# Patient Record
Sex: FEMALE | Race: WHITE | NOT HISPANIC OR LATINO | Employment: OTHER
[De-identification: names, ages, dates, MRNs, and addresses within clinical notes are randomized per-mention and may not be internally consistent; named-entity substitution may affect disease eponyms.]

---

## 2017-07-01 ENCOUNTER — HEALTH MAINTENANCE LETTER (OUTPATIENT)
Age: 57
End: 2017-07-01

## 2017-08-23 ENCOUNTER — HOSPITAL ENCOUNTER (OUTPATIENT)
Dept: MAMMOGRAPHY | Facility: CLINIC | Age: 57
Discharge: HOME | End: 2017-08-23
Attending: INTERNAL MEDICINE | Admitting: INTERNAL MEDICINE
Payer: COMMERCIAL

## 2017-08-23 DIAGNOSIS — Z12.31 VISIT FOR SCREENING MAMMOGRAM: ICD-10-CM

## 2017-08-23 PROCEDURE — G0202 SCR MAMMO BI INCL CAD: HCPCS

## 2018-07-07 ENCOUNTER — HEALTH MAINTENANCE LETTER (OUTPATIENT)
Age: 58
End: 2018-07-07

## 2019-02-18 ENCOUNTER — THERAPY VISIT (OUTPATIENT)
Dept: PHYSICAL THERAPY | Facility: CLINIC | Age: 59
End: 2019-02-18
Payer: COMMERCIAL

## 2019-02-18 DIAGNOSIS — M54.50 LOW BACK PAIN: Primary | ICD-10-CM

## 2019-02-18 PROCEDURE — 97140 MANUAL THERAPY 1/> REGIONS: CPT | Mod: GP | Performed by: PHYSICAL THERAPIST

## 2019-02-18 PROCEDURE — 97162 PT EVAL MOD COMPLEX 30 MIN: CPT | Mod: GP | Performed by: PHYSICAL THERAPIST

## 2019-02-18 PROCEDURE — 97110 THERAPEUTIC EXERCISES: CPT | Mod: GP | Performed by: PHYSICAL THERAPIST

## 2019-02-18 NOTE — PROGRESS NOTES
[facility] Initial Evaluation  Subjective:  The history is provided by the patient and medical records.   Breanne Mckee is a 58 year old female with a lumbar condition.  Condition occurred with:  Insidious onset.  Condition occurred: for unknown reasons.  This is a new condition  February 13, 2019.  No injury.  After working the next day could barely walk afterwards due to the pain.  She has pain in left SI, hip area and radiating down into leg with a very heavy feeling in leg..    Patient reports pain:  SI joint left.  Radiates to:  Gluteals left and thigh left.  Pain is described as aching and is intermittent and reported as 7/10.  Associated symptoms:  Loss of strength and loss of motion/stiffness. Pain is the same all the time (sleeping less than 2 hours).  Symptoms are exacerbated by certain positions, standing, lying down and sitting (sit to stand and turning in bed) and relieved by nothing and ice.  Since onset symptoms are unchanged.  Special tests:  MRI (DJD, no disc dejon noted).  Previous treatment includes chiropractic.  There was mild improvement following previous treatment.  General health as reported by patient is good.  Pertinent medical history includes:  Thyroid problems.  Medical allergies: yes.  Other surgeries include:  Other.  Current medications:  Thyroid medication (Tylenol).  Current occupation is RN.  Patient is working in normal job without restrictions.  Primary job tasks include:  Prolonged standing and other (pushing/pulling, computer work).        Red flags:  None as reported by the patient.                        Objective:        Flexibility/Screens:       Lower Extremity:  Decreased left lower extremity flexibility:Piriformis                 Lumbar/SI Evaluation  ROM:    AROM Lumbar:   Flexion:          Hands to mid thigh, pain on left and increases in leg  Ext:                    BUCK ok   Side Bend:        Left:     Right:   Rotation:           Left:      Right:   Side Glide:        Left:  Major loss    Right:  Major loss          Lumbar Myotomes:  normal                Lumbar Dermtomes:  normal                  Lumbar Palpation:    Tenderness present at Left:    Piriformis; PSIS and Gluteus Medius          SI joint/Sacrum:        Left positive at:    Forward bend standing    Sacral conclusion left:  Anterior inominate                                               General     ROS    Assessment/Plan:    Patient is a 58 year old female with lumbar complaints.    Patient has the following significant findings with corresponding treatment plan.                Diagnosis 1:  Low back and left hip pain  Pain -  manual therapy  Decreased ROM/flexibility - manual therapy and therapeutic exercise    Therapy Evaluation Codes:   1) History comprised of:   Personal factors that impact the plan of care:      Past/current experiences and Profession.    Comorbidity factors that impact the plan of care are:      None.     Medications impacting care: None.  2) Examination of Body Systems comprised of:   Body structures and functions that impact the plan of care:      Lumbar spine and Sacral illiac joint.   Activity limitations that impact the plan of care are:      Bending, Dressing, Lifting, Sitting, Stairs, Standing, Walking, Working, Sleeping and Laying down.  3) Clinical presentation characteristics are:   Unstable/Unpredictable.  4) Decision-Making    Moderate complexity using standardized patient assessment instrument and/or measureable assessment of functional outcome.  Cumulative Therapy Evaluation is: Moderate complexity.    Previous and current functional limitations:  (See Goal Flow Sheet for this information)    Short term and Long term goals: (See Goal Flow Sheet for this information)     Communication ability:  Patient appears to be able to clearly communicate and understand verbal and written communication and follow directions correctly.  Treatment Explanation - The  following has been discussed with the patient:   RX ordered/plan of care  Anticipated outcomes  Possible risks and side effects  This patient would benefit from PT intervention to resume normal activities.   Rehab potential is excellent.    Frequency:  1 X week, once daily  Duration:  for 8 weeks  Discharge Plan:  Achieve all LTG.  Independent in home treatment program.  Reach maximal therapeutic benefit.    Please refer to the daily flowsheet for treatment today, total treatment time and time spent performing 1:1 timed codes.

## 2019-02-20 ENCOUNTER — APPOINTMENT (OUTPATIENT)
Dept: GENERAL RADIOLOGY | Facility: CLINIC | Age: 59
End: 2019-02-20
Attending: EMERGENCY MEDICINE
Payer: COMMERCIAL

## 2019-02-20 ENCOUNTER — HOSPITAL ENCOUNTER (OUTPATIENT)
Facility: CLINIC | Age: 59
Setting detail: OBSERVATION
Discharge: HOME | End: 2019-02-21
Attending: EMERGENCY MEDICINE | Admitting: INTERNAL MEDICINE
Payer: COMMERCIAL

## 2019-02-20 DIAGNOSIS — M54.59 INTRACTABLE LOW BACK PAIN: ICD-10-CM

## 2019-02-20 LAB
ANION GAP SERPL CALCULATED.3IONS-SCNC: 8 MMOL/L (ref 3–14)
BASOPHILS # BLD AUTO: 0 10E9/L (ref 0–0.2)
BASOPHILS NFR BLD AUTO: 0.2 %
BUN SERPL-MCNC: 20 MG/DL (ref 7–30)
CALCIUM SERPL-MCNC: 8.6 MG/DL (ref 8.5–10.1)
CHLORIDE SERPL-SCNC: 105 MMOL/L (ref 94–109)
CO2 SERPL-SCNC: 26 MMOL/L (ref 20–32)
CREAT SERPL-MCNC: 0.82 MG/DL (ref 0.52–1.04)
DIFFERENTIAL METHOD BLD: ABNORMAL
EOSINOPHIL # BLD AUTO: 0 10E9/L (ref 0–0.7)
EOSINOPHIL NFR BLD AUTO: 0.2 %
ERYTHROCYTE [DISTWIDTH] IN BLOOD BY AUTOMATED COUNT: 13.7 % (ref 10–15)
GFR SERPL CREATININE-BSD FRML MDRD: 78 ML/MIN/{1.73_M2}
GLUCOSE SERPL-MCNC: 139 MG/DL (ref 70–99)
HCT VFR BLD AUTO: 37.5 % (ref 35–47)
HGB BLD-MCNC: 12.1 G/DL (ref 11.7–15.7)
IMM GRANULOCYTES # BLD: 0.1 10E9/L (ref 0–0.4)
IMM GRANULOCYTES NFR BLD: 0.6 %
LYMPHOCYTES # BLD AUTO: 0.9 10E9/L (ref 0.8–5.3)
LYMPHOCYTES NFR BLD AUTO: 7.3 %
MCH RBC QN AUTO: 27.1 PG (ref 26.5–33)
MCHC RBC AUTO-ENTMCNC: 32.3 G/DL (ref 31.5–36.5)
MCV RBC AUTO: 84 FL (ref 78–100)
MONOCYTES # BLD AUTO: 0.1 10E9/L (ref 0–1.3)
MONOCYTES NFR BLD AUTO: 1.1 %
NEUTROPHILS # BLD AUTO: 11.5 10E9/L (ref 1.6–8.3)
NEUTROPHILS NFR BLD AUTO: 90.6 %
NRBC # BLD AUTO: 0 10*3/UL
NRBC BLD AUTO-RTO: 0 /100
PLATELET # BLD AUTO: 428 10E9/L (ref 150–450)
POTASSIUM SERPL-SCNC: 4.3 MMOL/L (ref 3.4–5.3)
RBC # BLD AUTO: 4.46 10E12/L (ref 3.8–5.2)
SODIUM SERPL-SCNC: 139 MMOL/L (ref 133–144)
WBC # BLD AUTO: 12.6 10E9/L (ref 4–11)

## 2019-02-20 PROCEDURE — G0378 HOSPITAL OBSERVATION PER HR: HCPCS

## 2019-02-20 PROCEDURE — 99220 ZZC INITIAL OBSERVATION CARE,LEVL III: CPT | Performed by: PHYSICIAN ASSISTANT

## 2019-02-20 PROCEDURE — 99285 EMERGENCY DEPT VISIT HI MDM: CPT | Mod: 25 | Performed by: EMERGENCY MEDICINE

## 2019-02-20 PROCEDURE — 73502 X-RAY EXAM HIP UNI 2-3 VIEWS: CPT

## 2019-02-20 PROCEDURE — 96376 TX/PRO/DX INJ SAME DRUG ADON: CPT

## 2019-02-20 PROCEDURE — 25000128 H RX IP 250 OP 636: Performed by: EMERGENCY MEDICINE

## 2019-02-20 PROCEDURE — 25000132 ZZH RX MED GY IP 250 OP 250 PS 637: Performed by: EMERGENCY MEDICINE

## 2019-02-20 PROCEDURE — 96375 TX/PRO/DX INJ NEW DRUG ADDON: CPT | Performed by: EMERGENCY MEDICINE

## 2019-02-20 PROCEDURE — 85025 COMPLETE CBC W/AUTO DIFF WBC: CPT | Performed by: EMERGENCY MEDICINE

## 2019-02-20 PROCEDURE — 25000132 ZZH RX MED GY IP 250 OP 250 PS 637: Performed by: PHYSICIAN ASSISTANT

## 2019-02-20 PROCEDURE — 80048 BASIC METABOLIC PNL TOTAL CA: CPT | Performed by: EMERGENCY MEDICINE

## 2019-02-20 PROCEDURE — 96376 TX/PRO/DX INJ SAME DRUG ADON: CPT | Performed by: EMERGENCY MEDICINE

## 2019-02-20 PROCEDURE — 99284 EMERGENCY DEPT VISIT MOD MDM: CPT | Mod: 25 | Performed by: EMERGENCY MEDICINE

## 2019-02-20 PROCEDURE — 96374 THER/PROPH/DIAG INJ IV PUSH: CPT | Performed by: EMERGENCY MEDICINE

## 2019-02-20 RX ORDER — HYDROMORPHONE HYDROCHLORIDE 1 MG/ML
.3-.5 INJECTION, SOLUTION INTRAMUSCULAR; INTRAVENOUS; SUBCUTANEOUS
Status: DISCONTINUED | OUTPATIENT
Start: 2019-02-20 | End: 2019-02-20 | Stop reason: DRUGHIGH

## 2019-02-20 RX ORDER — THYROID 60 MG/1
60 TABLET ORAL DAILY
Status: DISCONTINUED | OUTPATIENT
Start: 2019-02-21 | End: 2019-02-21 | Stop reason: HOSPADM

## 2019-02-20 RX ORDER — NALOXONE HYDROCHLORIDE 0.4 MG/ML
.1-.4 INJECTION, SOLUTION INTRAMUSCULAR; INTRAVENOUS; SUBCUTANEOUS
Status: DISCONTINUED | OUTPATIENT
Start: 2019-02-20 | End: 2019-02-20

## 2019-02-20 RX ORDER — ACETAMINOPHEN 500 MG
1000 TABLET ORAL ONCE
Status: COMPLETED | OUTPATIENT
Start: 2019-02-20 | End: 2019-02-20

## 2019-02-20 RX ORDER — LORAZEPAM 2 MG/ML
0.5 INJECTION INTRAMUSCULAR EVERY 4 HOURS PRN
Status: DISCONTINUED | OUTPATIENT
Start: 2019-02-20 | End: 2019-02-20

## 2019-02-20 RX ORDER — HYDROMORPHONE HYDROCHLORIDE 2 MG/1
2 TABLET ORAL
Status: DISCONTINUED | OUTPATIENT
Start: 2019-02-20 | End: 2019-02-21 | Stop reason: HOSPADM

## 2019-02-20 RX ORDER — LIDOCAINE 4 G/G
1 PATCH TOPICAL
Status: DISCONTINUED | OUTPATIENT
Start: 2019-02-20 | End: 2019-02-21 | Stop reason: HOSPADM

## 2019-02-20 RX ORDER — ACETAMINOPHEN 325 MG/1
975 TABLET ORAL
Status: DISCONTINUED | OUTPATIENT
Start: 2019-02-21 | End: 2019-02-21

## 2019-02-20 RX ORDER — LORAZEPAM 2 MG/ML
0.5 INJECTION INTRAMUSCULAR ONCE
Status: COMPLETED | OUTPATIENT
Start: 2019-02-20 | End: 2019-02-20

## 2019-02-20 RX ORDER — CALCIUM CARBONATE 500 MG/1
500 TABLET, CHEWABLE ORAL 2 TIMES DAILY PRN
Status: DISCONTINUED | OUTPATIENT
Start: 2019-02-20 | End: 2019-02-21 | Stop reason: HOSPADM

## 2019-02-20 RX ORDER — PROCHLORPERAZINE 25 MG
25 SUPPOSITORY, RECTAL RECTAL EVERY 12 HOURS PRN
Status: DISCONTINUED | OUTPATIENT
Start: 2019-02-20 | End: 2019-02-21 | Stop reason: HOSPADM

## 2019-02-20 RX ORDER — HYDROMORPHONE HCL/0.9% NACL/PF 0.2MG/0.2
0.2 SYRINGE (ML) INTRAVENOUS EVERY 4 HOURS PRN
Status: DISCONTINUED | OUTPATIENT
Start: 2019-02-20 | End: 2019-02-21 | Stop reason: HOSPADM

## 2019-02-20 RX ORDER — NALOXONE HYDROCHLORIDE 0.4 MG/ML
.1-.4 INJECTION, SOLUTION INTRAMUSCULAR; INTRAVENOUS; SUBCUTANEOUS
Status: DISCONTINUED | OUTPATIENT
Start: 2019-02-20 | End: 2019-02-21 | Stop reason: HOSPADM

## 2019-02-20 RX ORDER — CYCLOBENZAPRINE HCL 5 MG
5-10 TABLET ORAL 3 TIMES DAILY PRN
Status: DISCONTINUED | OUTPATIENT
Start: 2019-02-20 | End: 2019-02-21 | Stop reason: HOSPADM

## 2019-02-20 RX ORDER — LORAZEPAM 0.5 MG/1
0.5 TABLET ORAL EVERY 4 HOURS PRN
Status: DISCONTINUED | OUTPATIENT
Start: 2019-02-20 | End: 2019-02-20

## 2019-02-20 RX ORDER — HYDROMORPHONE HCL/0.9% NACL/PF 0.2MG/0.2
0.2 SYRINGE (ML) INTRAVENOUS ONCE
Status: COMPLETED | OUTPATIENT
Start: 2019-02-20 | End: 2019-02-20

## 2019-02-20 RX ORDER — ONDANSETRON 4 MG/1
4 TABLET, ORALLY DISINTEGRATING ORAL EVERY 6 HOURS PRN
Status: DISCONTINUED | OUTPATIENT
Start: 2019-02-20 | End: 2019-02-21 | Stop reason: HOSPADM

## 2019-02-20 RX ORDER — ONDANSETRON 2 MG/ML
4 INJECTION INTRAMUSCULAR; INTRAVENOUS EVERY 6 HOURS PRN
Status: DISCONTINUED | OUTPATIENT
Start: 2019-02-20 | End: 2019-02-21 | Stop reason: HOSPADM

## 2019-02-20 RX ORDER — ONDANSETRON 2 MG/ML
4 INJECTION INTRAMUSCULAR; INTRAVENOUS
Status: DISCONTINUED | OUTPATIENT
Start: 2019-02-20 | End: 2019-02-20 | Stop reason: DRUGHIGH

## 2019-02-20 RX ORDER — PROCHLORPERAZINE MALEATE 10 MG
10 TABLET ORAL EVERY 6 HOURS PRN
Status: DISCONTINUED | OUTPATIENT
Start: 2019-02-20 | End: 2019-02-21 | Stop reason: HOSPADM

## 2019-02-20 RX ORDER — TRAMADOL HYDROCHLORIDE 50 MG/1
50 TABLET ORAL ONCE
Status: DISCONTINUED | OUTPATIENT
Start: 2019-02-20 | End: 2019-02-20 | Stop reason: RX

## 2019-02-20 RX ADMIN — ACETAMINOPHEN 1000 MG: 500 TABLET, FILM COATED ORAL at 16:54

## 2019-02-20 RX ADMIN — Medication 0.5 MG: at 18:20

## 2019-02-20 RX ADMIN — HYDROMORPHONE HYDROCHLORIDE 2 MG: 2 TABLET ORAL at 23:41

## 2019-02-20 RX ADMIN — ONDANSETRON 4 MG: 2 INJECTION INTRAMUSCULAR; INTRAVENOUS at 21:50

## 2019-02-20 RX ADMIN — CALCIUM CARBONATE (ANTACID) CHEW TAB 500 MG 500 MG: 500 CHEW TAB at 22:37

## 2019-02-20 RX ADMIN — Medication 0.2 MG: at 14:41

## 2019-02-20 RX ADMIN — ONDANSETRON 4 MG: 2 INJECTION INTRAMUSCULAR; INTRAVENOUS at 14:38

## 2019-02-20 RX ADMIN — LIDOCAINE 1 PATCH: 560 PATCH PERCUTANEOUS; TOPICAL; TRANSDERMAL at 20:02

## 2019-02-20 RX ADMIN — LORAZEPAM 0.5 MG: 2 INJECTION, SOLUTION INTRAMUSCULAR; INTRAVENOUS at 15:33

## 2019-02-20 RX ADMIN — Medication 0.5 MG: at 20:30

## 2019-02-20 RX ADMIN — CYCLOBENZAPRINE HYDROCHLORIDE 5 MG: 5 TABLET, FILM COATED ORAL at 20:02

## 2019-02-20 ASSESSMENT — MIFFLIN-ST. JEOR
SCORE: 1153.25
SCORE: 1136.07

## 2019-02-20 NOTE — ED PROVIDER NOTES
History     Chief Complaint   Patient presents with     Back Pain     back pain for past week, seen at [facility], had MRI. today pain increased. no injury     HPI  Breanne Mckee is a 58 year old female who presents with low back pain, left buttock and left hip pain described as a searing tearing burning sensation that began on Wednesday without inciting trauma or strain.  She has not had such pain in the past.  Seen in urgent care on 2/16, diagnosed with low back pain, prescription for Toradol and prednisone.  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  She had MRI lumbar spine accomplished, was given a small dose of fentanyl and 3 Dilaudid tabs with Zofran.  Her MR was significant for facet disease, worst at L4-5 on the left side.  There is no significant spinal or foraminal stenosis.  She denies saddle anesthesia, loss control bowel, weakness or numbness.  Complains of some tingling intermittently in the left heel not currently.  No prior back surgery or significant injury.  Followed up at clinic this morning in [place], restarted on prednisone 60 mg daily, she took that and throughout.  Her pain is worse uncontrolled presents here for ongoing pain.  When queried why here versus [employer], she and her  report that it is snowing too hard to drive downtown today.    MRI spine 2/17/19  CONCLUSION:  1.  For counting purposes this patient is considered to have 5 lumbar type  vertebral bodies with a transitional partially sacralized L5 segment. Using this  counting method the tip of the spinal cord is located at the T12-L1 level. This  counting method is different from that used on the dictation from MRI  06/28/2011.  2.  Normal vertebral body heights, alignment and marrow signal.  3.  There is no high-grade central spinal canal narrowing in the lumbar region.  No severe right or left-sided foraminal narrowing. No significant lumbar  disc  herniation.  4.  At the L4-transitional L5 level there is severe facet arthropathy again seen  which was present previously. There is associated mild edema in the right L4 and  right L5 pedicles and bony right L4-L5 facets. This is likely all degenerative  in nature. Milder facet arthropathy at the remaining lumbar levels.    Allergies:  Allergies   Allergen Reactions     Codeine      Heart Palpitations     Erythromycin Hives     No Clinical Screening - See Comments      Allergic to all narcotics.       Penicillins Hives       Problem List:    Patient Active Problem List    Diagnosis Date Noted     Left-sided thoracic back pain 05/25/2016     Priority: Medium     Vaginitis and vulvovaginitis 11/12/2013     Priority: Medium     Problem list name updated by automated process. Provider to review       Shoulder pain 05/09/2013     Priority: Medium     Low back pain 01/31/2012     Priority: Medium     HYPERLIPIDEMIA LDL GOAL <160 10/31/2010     Priority: Medium     Hypothyroidism 02/10/2006     Priority: Medium     Hx of positive anitbodies with sx of severe depression in past.  Apparently had test showing poor metabolism from T4 to T3.  Started on armour thyroid.  All function test have been normal.  [facility] [place]  Problem list name updated by automated process. Provider to review          Past Medical History:    Past Medical History:   Diagnosis Date     NAREN 2 4/06     Depressive disorder, not elsewhere classified      Headache      Other and unspecified hyperlipidemia      Unspecified hypothyroidism        Past Surgical History:    Past Surgical History:   Procedure Laterality Date     CHOLECYSTECTOMY, LAPOROSCOPIC  3/22/10     LEEP TX, CERVICAL  4/06    NAREN 2       Family History:    Family History   Problem Relation Age of Onset     Diabetes Mother      Hypertension Mother      Cerebrovascular Disease Mother      Lipids Mother      Diabetes Father      Heart Disease Father       "Neurologic Disorder Father      Thyroid Disease Maternal Grandmother      Breast Cancer Maternal Grandmother      Respiratory Maternal Grandfather      Arthritis Paternal Grandmother      Heart Disease Paternal Grandfather        Social History:  Marital Status:   [2]  Social History     Tobacco Use     Smoking status: Never Smoker     Smokeless tobacco: Never Used   Substance Use Topics     Alcohol use: No     Drug use: No        Medications:      thyroid (ARMOUR THYROID) 60 MG tablet         Review of Systems  All other systems reviewed and are negative.    Physical Exam   BP: 106/56  Pulse: 59  Heart Rate: 57  Temp: 97.5  F (36.4  C)  Resp: 16  Height: 157.5 cm (5' 2\")  Weight: 60.8 kg (134 lb)  SpO2: 98 %      Physical Exam  Nontoxic-appearing no respiratory distress alert and oriented x3  In obvious discomfort, tearful at times  Head atraumatic no cephalic  Lungs clear  Heart regular no murmur  Abdomen soft nontender bowel sounds positive  Strength 5/5 throughout lower extremities, sensation intact light touch  Femoral pulses pedal pulses symmetrical and strong  No midline or paralumbar tenderness to palpation, there is some mild tenderness to the left SI joint, mild tenderness to palpation of the left upper outer buttock, and mild/moderate tenderness palpation left greater trochanter.  ED Course        Procedures               Critical Care time:  none                 Results for orders placed or performed during the hospital encounter of 02/20/19 (from the past 24 hour(s))   Pelvis XR w/ unilateral hip left    Narrative    PELVIS AND HIP LEFT ONE VIEW   2/20/2019 3:22 PM     HISTORY: Pain.    COMPARISON: 1/24/2011 pelvis x-ray.      Impression    IMPRESSION: Hip joint spaces appear well preserved. No evidence of  acute fracture or subluxation. No evidence of avascular necrosis.    VEGA GILES MD       Medications   ondansetron (ZOFRAN) injection 4 mg (4 mg Intravenous Given 2/20/19 4860) "   HYDROmorphone (DILAUDID) injection 0.2 mg (0.2 mg Intravenous Given 2/20/19 1441)   LORazepam (ATIVAN) injection 0.5 mg (0.5 mg Intravenous Given 2/20/19 1533)       Assessments & Plan (with Medical Decision Making)  58-year-old presents with a week's worth of low back pain, initially began as a pressure sensation across her lumbar, now is localized to her left upper outer buttock, left groin, described as a tearing sensation with any movement.  She responded well here to us hydromorphone 0.2 mg, and 0.5 mg of lorazepam.  Continues to have severe pain with any movement but is comfortable at rest.  Her exam is nonfocal, strength remains intact as does sensation, able to passively range her hip without significant worsening of pain, she has tenderness to palpation over the greater trochanter of the left side.  X-ray exam pelvis and left hip negative by my read as well as radiology.  Her MRI from 2/17/19 showed some mild multilevel foraminal stenosis, and severe facet arthropathy L4-5, although all on the right side.  Her pulses are intact and symmetrical, the leg is well perfused, there is no inguinal adenopathy.  Findings most consistent with lumbar radiculopathy, although as above MRI results are underwhelming.  No evidence for discitis, osteomyelitis, spinal stenosis, epidural fluid collection.  She is not tolerating her pain, and not able to treat adequately outpatient.  She will be admitted for ongoing pain control and further evaluation.  Reviewed with Zaida Jackson who accepts for hospitalist service.     I have reviewed the nursing notes.    I have reviewed the findings, diagnosis, plan and need for follow up with the patient.          Medication List      There are no discharge medications for this visit.         Final diagnoses:   Intractable low back pain       2/20/2019   [facility] EMERGENCY DEPARTMENT     Leonard Smith MD  02/20/19 9780

## 2019-02-20 NOTE — ED NOTES
"Patient has  [facility] to Observation  order. Patient has been given the Observation brochure -  What does Observation mean to me.\"  Patient has been given the opportunity to ask questions about observation status and their plan of care.      Laure Santos  "

## 2019-02-20 NOTE — ED NOTES
"Pt here with back pain, has had for 1 week. She was seen at an [facility] clinic this morning. Has taken 2 mg of oral dilaudid today and also got a shot of toradol at the clinic. Took 60 mg of prednisone today per the clinic but vomited them up. Was seen at [place] ER on Sunday and had an MRI. Pain has gotten much worse. Pain is radiating down her left leg, states \"it feels like my hip is tearing\".   "

## 2019-02-21 ENCOUNTER — APPOINTMENT (OUTPATIENT)
Dept: PHYSICAL THERAPY | Facility: CLINIC | Age: 59
End: 2019-02-21
Payer: COMMERCIAL

## 2019-02-21 VITALS
TEMPERATURE: 98.4 F | SYSTOLIC BLOOD PRESSURE: 127 MMHG | BODY MASS INDEX: 25.36 KG/M2 | OXYGEN SATURATION: 98 % | RESPIRATION RATE: 18 BRPM | HEART RATE: 71 BPM | HEIGHT: 62 IN | DIASTOLIC BLOOD PRESSURE: 61 MMHG | WEIGHT: 137.79 LBS

## 2019-02-21 PROCEDURE — 25000132 ZZH RX MED GY IP 250 OP 250 PS 637: Performed by: PHYSICIAN ASSISTANT

## 2019-02-21 PROCEDURE — 96376 TX/PRO/DX INJ SAME DRUG ADON: CPT

## 2019-02-21 PROCEDURE — 99207 ZZC CDG-CODE CATEGORY CHANGED: CPT | Performed by: FAMILY MEDICINE

## 2019-02-21 PROCEDURE — 25000128 H RX IP 250 OP 636: Performed by: PHYSICIAN ASSISTANT

## 2019-02-21 PROCEDURE — 97161 PT EVAL LOW COMPLEX 20 MIN: CPT | Mod: GP | Performed by: PHYSICAL THERAPIST

## 2019-02-21 PROCEDURE — G0378 HOSPITAL OBSERVATION PER HR: HCPCS

## 2019-02-21 PROCEDURE — 99217 ZZC OBSERVATION CARE DISCHARGE: CPT | Performed by: FAMILY MEDICINE

## 2019-02-21 PROCEDURE — 40000193 ZZH STATISTIC PT WARD VISIT: Performed by: PHYSICAL THERAPIST

## 2019-02-21 PROCEDURE — 25000132 ZZH RX MED GY IP 250 OP 250 PS 637: Performed by: FAMILY MEDICINE

## 2019-02-21 PROCEDURE — 25000132 ZZH RX MED GY IP 250 OP 250 PS 637: Performed by: INTERNAL MEDICINE

## 2019-02-21 RX ORDER — ACETAMINOPHEN 325 MG/1
975 TABLET ORAL 4 TIMES DAILY
Qty: 360 TABLET | Refills: 0 | COMMUNITY
Start: 2019-02-21

## 2019-02-21 RX ORDER — HYDROMORPHONE HYDROCHLORIDE 2 MG/1
2 TABLET ORAL
Qty: 18 TABLET | Refills: 0 | Status: SHIPPED | OUTPATIENT
Start: 2019-02-21 | End: 2019-02-24

## 2019-02-21 RX ORDER — CYCLOBENZAPRINE HCL 5 MG
5 TABLET ORAL ONCE
Status: COMPLETED | OUTPATIENT
Start: 2019-02-21 | End: 2019-02-21

## 2019-02-21 RX ORDER — ACETAMINOPHEN 325 MG/1
975 TABLET ORAL 4 TIMES DAILY
Status: DISCONTINUED | OUTPATIENT
Start: 2019-02-21 | End: 2019-02-21 | Stop reason: HOSPADM

## 2019-02-21 RX ORDER — ACETAMINOPHEN 325 MG/1
975 TABLET ORAL
Status: DISCONTINUED | OUTPATIENT
Start: 2019-02-21 | End: 2019-02-21

## 2019-02-21 RX ORDER — CYCLOBENZAPRINE HCL 5 MG
5-10 TABLET ORAL 3 TIMES DAILY PRN
Qty: 25 TABLET | Refills: 1 | Status: SHIPPED | OUTPATIENT
Start: 2019-02-21 | End: 2019-03-03

## 2019-02-21 RX ORDER — ONDANSETRON 4 MG/1
4 TABLET, ORALLY DISINTEGRATING ORAL EVERY 6 HOURS PRN
Qty: 20 TABLET | Refills: 1 | Status: SHIPPED | OUTPATIENT
Start: 2019-02-21

## 2019-02-21 RX ADMIN — ACETAMINOPHEN 975 MG: 325 TABLET, FILM COATED ORAL at 16:10

## 2019-02-21 RX ADMIN — CYCLOBENZAPRINE HYDROCHLORIDE 5 MG: 5 TABLET, FILM COATED ORAL at 09:17

## 2019-02-21 RX ADMIN — LEVOTHYROXINE, LIOTHYRONINE 60 MG: 38; 9 TABLET ORAL at 09:09

## 2019-02-21 RX ADMIN — OMEPRAZOLE 20 MG: 20 CAPSULE, DELAYED RELEASE ORAL at 06:19

## 2019-02-21 RX ADMIN — HYDROMORPHONE HYDROCHLORIDE 2 MG: 2 TABLET ORAL at 11:44

## 2019-02-21 RX ADMIN — ACETAMINOPHEN 975 MG: 325 TABLET, FILM COATED ORAL at 06:19

## 2019-02-21 RX ADMIN — ACETAMINOPHEN 975 MG: 325 TABLET, FILM COATED ORAL at 00:18

## 2019-02-21 RX ADMIN — CYCLOBENZAPRINE HYDROCHLORIDE 5 MG: 5 TABLET, FILM COATED ORAL at 06:19

## 2019-02-21 RX ADMIN — CYCLOBENZAPRINE HYDROCHLORIDE 10 MG: 5 TABLET, FILM COATED ORAL at 16:18

## 2019-02-21 RX ADMIN — ACETAMINOPHEN 975 MG: 325 TABLET, FILM COATED ORAL at 11:03

## 2019-02-21 RX ADMIN — Medication 0.2 MG: at 13:46

## 2019-02-21 NOTE — PROGRESS NOTES
02/21/19 0800   Quick Adds   Type of Visit Initial PT Evaluation   Living Environment   Lives With spouse   Living Arrangements house   Home Accessibility stairs to enter home;stairs within home   Functional Level Prior   Ambulation 0-->independent   Transferring 0-->independent   Toileting 0-->independent   Bathing 0-->independent   Communication 0-->understands/communicates without difficulty   Swallowing 0-->swallows foods/liquids without difficulty   Cognition 0 - no cognition issues reported   Fall history within last six months no   Prior Functional Level Comment Indep. at baseline   General Information   Onset of Illness/Injury or Date of Surgery - Date 02/20/19   Referring Physician Kaden CALABRESE   Patient/Family Goals Statement To decrease pain   Pertinent History of Current Problem (include personal factors and/or comorbidities that impact the POC) 58 year old female admitted on 2/20/2019. She has history of hashimotos thyroiditis. She presents with one week of lower back pain and one day of left hip pain. MRI revealed djd, severefacet arthroplasty  L4-L5   Precautions/Limitations spinal precautions   General Observations Pt  SL in bed- reports pain 3/10 at rest, but more intense with activity. (min/ mod)   Pain central LBP, Left glut area. Also c/o Left anterior  thigh pain.  no saddle numbness, change in B and B fucntion.  Pt reports  insidious onset of SX one week ago , recently became intolerable   Pain Assessment   Patient Currently in Pain Yes, see Vital Sign flowsheet   Range of Motion (ROM)   ROM Comment WFL. negative SLR, hip compression.   relief w/ long leg distraction  SI compression negative   Strength   Strength Comments Nt   Bed Mobility   Bed Mobility Comments SBA to indep. verbally reviewed body mechanics- avoiding bending/ twisting. monitoring of SX    Transfer Skills   Transfer Comments Indep   Gait   Gait Comments Amb 80 feet  x2 with no device, SBA to in dep. guarded ,but steady.   "Pain present, but manageable, able to navigate steps w/o difficulty    Clinical Impression   PT Diagnosis L-4-L5 facet arthropathy   Anticipated Discharge Disposition Home with Outpatient Therapy   Risk & Benefits of therapy have been explained Yes   Patient, Family & other staff in agreement with plan of care Yes   Clinical Impression Comments one time session, mobility now sufficient  for retun home.  : reports relief w/ long leg distraction, muscle energy techniques. recommned to continue w/ outpatient PT   [facility] AM-PAC  \"6 Clicks\" V.2 Basic Mobility Inpatient Short Form   1. Turning from your back to your side while in a flat bed without using bedrails? 3 - A Little   2. Moving from lying on your back to sitting on the side of a flat bed without using bedrails? 3 - A Little   3. Moving to and from a bed to a chair (including a wheelchair)? 3 - A Little   4. Standing up from a chair using your arms (e.g., wheelchair, or bedside chair)? 3 - A Little   5. To walk in hospital room? 3 - A Little   6. Climbing 3-5 steps with a railing? 3 - A Little   Basic Mobility Raw Score (Score out of 24.Lower scores equate to lower levels of function) 18   Total Evaluation Time   Total Evaluation Time (Minutes) 20     "

## 2019-02-21 NOTE — PROGRESS NOTES
"[facility] [place]    Patient admitted to room 2207 at approximately 2030 via cart from emergency room. Patient was accompanied by spouse.     Verbal SBAR report received from ED RN prior to patient arrival.     Patient ambulated to bed with stand-by assist. Patient alert and oriented X 3. Pain is not well controlled.  Medication(s) being used: prescription narcotics including severe 10/10 pain, Iv dilaudid administered which was effective.. 0-10 Pain Scale: 10. Admission vital signs: Blood pressure 131/70, pulse 68, temperature 97.8  F (36.6  C), temperature source Oral, resp. rate 16, height 1.575 m (5' 2\"), weight 62.5 kg (137 lb 12.6 oz), SpO2 97 %. Patient was oriented to plan of care, call light, bed controls, tv, telephone, bathroom and visiting hours.     Risk Assessment    The following safety risks were identified during admission: none. Yellow risk band applied: NO.     Skin Initial Assessment    This writer admitted this patient and completed a full skin assessment and Ilir score in the Adult PCS flowsheet. Appropriate interventions initiated as needed.     Secondary skin check completed by patient declined skin assessment, stating skin was fine and she was in too much pain. .    Ilir Risk Assessment  Sensory Perception: 4-->no impairment  Moisture: 4-->rarely moist  Activity: 3-->walks occasionally  Mobility: 3-->slightly limited  Nutrition: 3-->adequate  Friction and Shear: 3-->no apparent problem  Ilir Score: 20    Daisy Marrero    "

## 2019-02-21 NOTE — PROGRESS NOTES
[facility] DISCHARGE NOTE    Patient discharged to home at 4:59 PM via wheel chair. Accompanied by spouse and staff. Discharge instructions reviewed with patient, opportunity offered to ask questions. Prescriptions sent to patients preferred pharmacy. All belongings sent with patient.    Anette Montoya

## 2019-02-21 NOTE — DISCHARGE SUMMARY
Admit Date:     02/20/2019   Discharge Date:           HISTORY OF PRESENT ILLNESS:  Breanne Mckee is a 58-year-old female with a history of Hashimoto thyroiditis.  She started having pain in her lower back 7 days ago.  It did start after sleeping in a recliner at her mother's house.  There was no trauma.  She is typically active.  She saw a chiropractor and a physical therapist without improvement.  Five days prior to admission, she woke up with a mild heaviness in both her calves and a pin-prickling sensation in her left heel.  She went to urgent care.  She was started on prednisone.  She got Toradol.  She then went on to develop epigastric pain after using ibuprofen.  She went to the ED.  She was given Carafate and a GI cocktail and things improved.  She was also given 25 mg of fentanyl for her back.  An MRI was done which showed at L4-L5, there was severe facet arthropathy.  There was also associated mild edema in the right L4 and right L5 pedicles in the bony right L4-L5 facets that was likely degenerative in nature.  On the morning of admission, she went to the [facility].  She was restarted on prednisone.  The pain was somewhat different.  There was sharp pain in her left lower back with movement.  She is having pain in the left hip radiating to the groin and left anterior thigh.      She was evaluated in the emergency room.  She had a pelvic x-ray that was unremarkable.  Her white count was 12.6, mild elevation and it was thought to be stress related.  She was admitted to the hospital for pain control.      The patient was put on oral Dilaudid and Flexeril and scheduled Tylenol.  She was also given a Lidoderm patch.  Initially the next morning, she was quite improved and was up and ambulating but then after that, she had increased pain when I saw her.      The plan at this time is to try to achieve pain control with her current regimen, and if we can, she would discharge.  I think that she likely does  have radiculopathy.  It is unclear from the MRI exactly what is causing this.  It seems like it is probably starting around the L4-L5 level.  At this point, I do not think that more steroids and anti-inflammatories are a good idea given her stomach problems.  She ultimately has been referred to the spine surgeon and needs to talk to them.  I did question whether an epidural steroid injection might help.      ASSESSMENT:     1.  Left lower back pain with probable left radicular pain.     2.  MRI shows no high-grade foraminal or spinal canal impingement but does show severe facet arthropathy with associated edema in the right L4 and right L5 pedicles and the bony right L4-L5 facets.   3.  History of Hashimoto's thyroiditis.      PLAN:  As above.  If the patient can achieve pain control, then she would discharge and follow up with Spine Surgery.  She would stay on scheduled Tylenol, Dilaudid p.r.n. and some Flexeril.  She seems to tolerate the Flexeril and Dilaudid combination but she was warned of sedation with that.  I would wonder if an epidural steroid injection would be possibly helpful.     Current Discharge Medication List      START taking these medications    Details   acetaminophen (TYLENOL) 325 MG tablet Take 3 tablets (975 mg) by mouth 4 times daily  Qty: 360 tablet, Refills: 0      cyclobenzaprine (FLEXERIL) 5 MG tablet Take 1-2 tablets (5-10 mg) by mouth 3 times daily as needed for muscle spasms  Qty: 25 tablet, Refills: 1    Associated Diagnoses: Intractable low back pain      HYDROmorphone (DILAUDID) 2 MG tablet Take 1 tablet (2 mg) by mouth every 3 hours as needed for moderate to severe pain  Qty: 18 tablet, Refills: 0    Associated Diagnoses: Intractable low back pain      omeprazole (PRILOSEC) 20 MG DR capsule Take 1 capsule (20 mg) by mouth every morning (before breakfast)  Qty: 30 capsule, Refills: 0      ondansetron (ZOFRAN-ODT) 4 MG ODT tab Take 1 tablet (4 mg) by mouth every 6 hours as needed  for nausea or vomiting  Qty: 20 tablet, Refills: 1    Associated Diagnoses: Intractable low back pain         CONTINUE these medications which have NOT CHANGED    Details   thyroid (ARMOUR THYROID) 60 MG tablet Take 60 mg by mouth daily            Unresulted Labs Ordered in the Past 30 Days of this Admission     No orders found for last 61 day(s).              Greater than 30 minutes spent on this.         JOSE DAMICO MD             D: 2019   T: 2019   MT: NTS      Name:     ROBIN FLORES   MRN:      [id number]        Account:        [id number]   :      1960           Admit Date:     2019                                  Discharge Date:       Document: [id number]       cc: Frederic Calixto MD

## 2019-02-21 NOTE — PROGRESS NOTES
[facility]      Subjective:  Pain better earlier-but now worse.  Pain is left low back, buttox into left anterior thigh  No fever  No weakness    Review of Systems:  CONSTITUTIONAL: NEGATIVE for fever, chills, change in weight  INTEGUMENTARY/SKIN: NEGATIVE for worrisome rashes, moles or lesions  EYES: NEGATIVE for vision changes or irritation  ENT/MOUTH: NEGATIVE for ear, mouth and throat problems  RESP: NEGATIVE for significant cough or SOB  BREAST: NEGATIVE for masses, tenderness or discharge  CV: NEGATIVE for chest pain, palpitations or peripheral edema  GI: NEGATIVE for nausea, abdominal pain, heartburn, or change in bowel habits  : NEGATIVE for frequency, dysuria, or hematuria  MUSCULOSKELETAL:above  NEURO: above  ENDOCRINE: NEGATIVE for temperature intolerance, skin/hair changes  HEME: NEGATIVE for bleeding problems  PSYCHIATRIC: NEGATIVE for changes in mood or affect    Physical Exam:  Vitals Were Reviewed    Patient Vitals for the past 16 hrs:   BP Temp Temp src Pulse Resp SpO2   02/21/19 1118 127/61 98.4  F (36.9  C) Oral 71 18 98 %   02/21/19 0801 -- 97.9  F (36.6  C) Oral 65 18 99 %   02/20/19 2324 127/59 98.1  F (36.7  C) Oral 66 16 97 %       No intake or output data in the 24 hours ending 02/21/19 1308    GENERAL APPEARANCE: healthy, alert and no distress  EYES: conjunctiva clear, eyes grossly normal  RESP: lungs clear to auscultation - no rales, rhonchi or wheezes  CV: regular rate and rhythm, normal S1 S2, no S3 or S4 and no murmur, click or rub   ABDOMEN: soft, nontender, no HSM or masses and bowel sounds normal  MS: no clubbing, cyanosis; no edema  SKIN: clear without significant rashes or lesions  NEURO: Normal strength and tone, sensory exam grossly normal, mentation intact and speech normal    Lab:  Recent Labs   Lab Test 02/20/19  1847      POTASSIUM 4.3   CHLORIDE 105   CO2 26   ANIONGAP 8   *   BUN 20   CR 0.82   NORMAN 8.6     CBC RESULTS:   Recent Labs    Lab Test 02/20/19  1847   WBC 12.6*   RBC 4.46   HGB 12.1   HCT 37.5          Results for orders placed or performed during the hospital encounter of 02/20/19 (from the past 24 hour(s))   Pelvis XR w/ unilateral hip left    Narrative    PELVIS AND HIP LEFT ONE VIEW   2/20/2019 3:22 PM     HISTORY: Pain.    COMPARISON: 1/24/2011 pelvis x-ray.      Impression    IMPRESSION: Hip joint spaces appear well preserved. No evidence of  acute fracture or subluxation. No evidence of avascular necrosis.    VEGA GILES MD   CBC with platelets differential   Result Value Ref Range    WBC 12.6 (H) 4.0 - 11.0 10e9/L    RBC Count 4.46 3.8 - 5.2 10e12/L    Hemoglobin 12.1 11.7 - 15.7 g/dL    Hematocrit 37.5 35.0 - 47.0 %    MCV 84 78 - 100 fl    MCH 27.1 26.5 - 33.0 pg    MCHC 32.3 31.5 - 36.5 g/dL    RDW 13.7 10.0 - 15.0 %    Platelet Count 428 150 - 450 10e9/L    Diff Method Automated Method     % Neutrophils 90.6 %    % Lymphocytes 7.3 %    % Monocytes 1.1 %    % Eosinophils 0.2 %    % Basophils 0.2 %    % Immature Granulocytes 0.6 %    Nucleated RBCs 0 0 /100    Absolute Neutrophil 11.5 (H) 1.6 - 8.3 10e9/L    Absolute Lymphocytes 0.9 0.8 - 5.3 10e9/L    Absolute Monocytes 0.1 0.0 - 1.3 10e9/L    Absolute Eosinophils 0.0 0.0 - 0.7 10e9/L    Absolute Basophils 0.0 0.0 - 0.2 10e9/L    Abs Immature Granulocytes 0.1 0 - 0.4 10e9/L    Absolute Nucleated RBC 0.0    Basic metabolic panel   Result Value Ref Range    Sodium 139 133 - 144 mmol/L    Potassium 4.3 3.4 - 5.3 mmol/L    Chloride 105 94 - 109 mmol/L    Carbon Dioxide 26 20 - 32 mmol/L    Anion Gap 8 3 - 14 mmol/L    Glucose 139 (H) 70 - 99 mg/dL    Urea Nitrogen 20 7 - 30 mg/dL    Creatinine 0.82 0.52 - 1.04 mg/dL    GFR Estimate 78 >60 mL/min/[1.73_m2]    GFR Estimate If Black 90 >60 mL/min/[1.73_m2]    Calcium 8.6 8.5 - 10.1 mg/dL       Assessment and Plan:    Breanne Mckee is a 58 year old female admitted on 2/20/2019. She has history of hashimotos thyroiditis.  "She presents with one week of lower back pain and one day of left hip pain.     Left hip pain  Intractable Back Pain  Degenerative Disc Disease  1 week of lower back pain intermittent calf heaviness and left heal tingling. Now in left lateral hip and anterior thigh, tearing, spasm. No known trauma/injury though lives active lifestyle. Evaluated in urgent care, ED and clinic. MR performed as below with mild degenerative changes. No red flag features. XR pelvis negative in ED. Currently no back pain, primarily tearing pain in hip/thigh. Appears to be musculoskeletal in nature, possibly due to changes in positioning/ambulation due to back pain. No focal tenderness on exam. No sensory changes.   MR performed 2/17/2019 at [facility] ED, report:   \"2.  Normal vertebral body heights, alignment and marrow signal.  3.  There is no high-grade central spinal canal narrowing in the lumbar region. No severe right or left-sided foraminal narrowing. No significant lumbar disc herniation.  4.  At the L4-transitional L5 level there is severe facet arthropathy again seen which was present previously. There is associated mild edema in the right L4 and right L5 pedicles and bony right L4-L5 facets. This is likely all degenerative in nature. Milder facet arthropathy at the remaining lumbar levels.\"  - pain: scheduled acetaminophen, oral hydromorphone prn, cyclobenzaprine prn, IV hydromorphone for breakthrough  - trial lidocaine patch  - stop prednisone, avoid NSAIDs due to nausea, emesis, epigastric pain  - PT consult for AM  - follow up with spine as outpatient, referral placed by ED previously  - patient received hydromorphone prescription from PCP, no further narcotics needed on discharge,  reviewed, only other script in past year was 3 tablets from ED    1:08 PM pain was better earlier , now worse. Bad in anterior left thigh     Leukocytosis  WBC 12.6. Suspect this is related to prednisone use. No fever, chills or infectious symptoms " recently.   - no intervention     Emesis and Epigastric pain, resolved  Non-tender on admission. Emesis after prednisone this morning. Pain developed 4 days ago in the setting of prednisone and increased NSAID use. Improved after Carafate and GI cocktail at outside hospital. Started taking omeprazole.  - continue omeprazole  - avoid NSAIDs  - stop prednisone     Hashimoto's thyroiditis   Chronic.   - continue home armour thyroid       Diet: Regular Diet Adult    DVT Prophylaxis: Low Risk/Ambulatory with no VTE prophylaxis indicated  Palmer Catheter: not present  Code Status: Full code     Plan-she appears to have a lumbar radiculopathy. MRI doesn't show over nerve impingement. Not tolerating steroids. Needs pain control. Ultimately she has been referred to spine surgery. I would question whether vasyl might be warranted.   If she can achieve adequate pain control --discharge today.

## 2019-02-21 NOTE — DISCHARGE INSTRUCTIONS
Use dilaudid for pain control.  Use scheduled tylenol.  Use flexeril as necessary (sedation is possible when combined with dilaudid).  Continue prilosec.  See the Spine specialist.

## 2019-02-21 NOTE — PLAN OF CARE
Patient with increased pain this afternoon, had a dose of IV dilaudid which was effective.  She has ambulated in room and in hallways.  She reports her bowel and bladder function is WNL.  Alert and orientated and makes her needs known. Anticipates discharge today.

## 2019-02-21 NOTE — PLAN OF CARE
Pain continues, states it is now in left upper leg, quad area and feels like muscle is ripping.   Forced herself to vomit after walking to bathroom.  Peppermint aromatherapy and sea bands administered to help with nausea. Needed assist of two to return to bed, very unsteady.   Declined oral and IV Dilaudid. Requested Tylenol  and flexerilfor pain. Also requesting IV Decadron, she feels that may help.   Bed alarm on for safety.

## 2019-02-21 NOTE — H&P
"[facility]    History and Physical - Hospitalist Service       Date of Admission:  2/20/2019    Assessment & Plan   Breanne Mckee is a 58 year old female admitted on 2/20/2019. She has history of hashimotos thyroiditis. She presents with one week of lower back pain and one day of left hip pain.    Left hip pain  Intractable Back Pain  Degenerative Disc Disease  1 week of lower back pain intermittent calf heaviness and left heal tingling. Now in left lateral hip and anterior thigh, tearing, spasm. No known trauma/injury though lives active lifestyle. Evaluated in urgent care, ED and clinic. MR performed as below with mild degenerative changes. No red flag features. XR pelvis negative in ED. Currently no back pain, primarily tearing pain in hip/thigh. Appears to be musculoskeletal in nature, possibly due to changes in positioning/ambulation due to back pain. No focal tenderness on exam. No sensory changes.   MR performed 2/17/2019 at [facility], report:   \"2.  Normal vertebral body heights, alignment and marrow signal.  3.  There is no high-grade central spinal canal narrowing in the lumbar region. No severe right or left-sided foraminal narrowing. No significant lumbar disc herniation.  4.  At the L4-transitional L5 level there is severe facet arthropathy again seen which was present previously. There is associated mild edema in the right L4 and right L5 pedicles and bony right L4-L5 facets. This is likely all degenerative in nature. Milder facet arthropathy at the remaining lumbar levels.\"  - pain: scheduled acetaminophen, oral hydromorphone prn, cyclobenzaprine prn, IV hydromorphone for breakthrough  - trial lidocaine patch  - stop prednisone, avoid NSAIDs due to nausea, emesis, epigastric pain  - PT consult for AM  - follow up with spine as outpatient, referral placed by ED previously  - patient received hydromorphone prescription from PCP, no further narcotics needed on discharge, " " reviewed, only other script in past year was 3 tablets from ED    Leukocytosis  WBC 12.6. Suspect this is related to prednisone use. No fever, chills or infectious symptoms recently.   - no intervention    Emesis and Epigastric pain, resolved  Non-tender on admission. Emesis after prednisone this morning. Pain developed 4 days ago in the setting of prednisone and increased NSAID use. Improved after Carafate and GI cocktail at outside hospital. Started taking omeprazole.  - continue omeprazole  - avoid NSAIDs  - stop prednisone    Hashimoto's thyroiditis   Chronic.   - continue home armour thyroid     Diet: Regular Diet Adult    DVT Prophylaxis: Low Risk/Ambulatory with no VTE prophylaxis indicated  Palmer Catheter: not present  Code Status: Full code    Disposition Plan   Expected discharge: Tomorrow, recommended to prior living arrangement once adequate pain management/ tolerating PO medications.  Entered: Ruth Alfonso PA-C 02/20/2019, 6:32 PM     The patient's care was discussed with the Attending Physician, Dr. Hammad Ureña and Patient.    Ruth Alfonso PA-C  [facility]  ______________________________________________________________________    Chief Complaint   Back pain    History is obtained from the patient    History of Present Illness   Breanne Mckee is a 58 year old female who presents with back pain.     She woke up with bilateral in her lower lumbar, coccyx/sacarl area 7 days ago, on Wednesday, described as a \"very sore back\". She had been sleeping in a recliner while visiting her mother in the hospital prior and did not really notice any pain the week following this. She did not have any known injury or trauma. She lives an active lifestyle where she shovels, works as a nurse and vacuums. She was seen by her chiropractor and a physical therapist, no real improvement with this. 5 days ago, she woke up with a mild numbness/heaviness in both of her calves and " "mild prickling sensation in her heel when she walked, she went to the urgency center to be evaluated where she was started on prednisone and got an injection of Toradol. She was also using ibuprofen and naproxen to manage her pain. She developed epigastric pain on Sunday and so went to the ED for further evaluation, somewhat improved after Carafate and a GI cocktail. She was given 25 mcg fentanyl which helped her back pain with improvement. An MRI was performed at that time which did not show any acute issue. Since then has been taking acetaminophen and took the 3 doses of oral dilaudid which she tolerated well when taken with Zofran (8 --> 10).     This morning she woke up with worsening pain, she went to the [facility] clinic. She was re-started on prednisone. She felt that pain today is different. It is a sharp pain with any movement. She feels like someone is \"tearing my muscles\". Located on the side of her left hip, her \"sacroilliac area\", upper left hip and it radiates to her left groin and left anterior thigh. When she moves it radiates to the buttock/hip. When she is still she feels an aching in her coccyx. Pain in her thigh improves when she bends her leg. She feels that something has been missed on the MRI as the \"pain is unbearable\". She is tearful and feels \"worn out\". No changes in strength. No numbness or tingling sensation today. No bladder/bowel incontinence.     Recent visits reviewed:  2/16/2019 went to urgent care ([facility]) for back pain across her lower hips without known trauma/injury. She had tried an adjustment from her chiropractor, acetaminophen, ibuprofen and naproxen without much relief. Discharged on prednisone and ibuprofen.  2/17/2019: seen in ED ([place]) with epigastric pain and low back pain. The epigastric pain was thought to be related to NSAID and steroid use and improved after GI cocktail. Recommended to stop taking NSAIDs and prednisone. MR of the spine was " "performed report below:  \"2.  Normal vertebral body heights, alignment and marrow signal.  3.  There is no high-grade central spinal canal narrowing in the lumbar region. No severe right or left-sided foraminal narrowing. No significant lumbar disc herniation.  4.  At the L4-transitional L5 level there is severe facet arthropathy again seen which was present previously. There is associated mild edema in the right L4 and right L5 pedicles and bony right L4-L5 facets. This is likely all degenerative in nature. Milder facet arthropathy at the remaining lumbar levels.\"  She was discharged with 3 tablets of hydromorphone and follow up with PCP as well as spine referral.   2/20/2019: seen by PCP, recommended prednisone over NSAIDs though patient requested Toradol for her pain and an injection was provided. 30 tablets of Dilaudid prescribed at that time.     Review of Systems    The 10 point Review of Systems is negative other than noted in the HPI or here.     Past Medical History    I have reviewed this patient's medical history and updated it with pertinent information if needed.   Past Medical History:   Diagnosis Date     NAREN 2 4/06    s/p LEEP     Depressive disorder, not elsewhere classified      Headache      Other and unspecified hyperlipidemia      Unspecified hypothyroidism      Past Surgical History   I have reviewed this patient's surgical history and updated it with pertinent information if needed.  Past Surgical History:   Procedure Laterality Date     CHOLECYSTECTOMY, LAPOROSCOPIC  3/22/10     LEEP TX, CERVICAL  4/06    NAREN 2     Social History   I have reviewed this patient's social history and updated it with pertinent information if needed.  Social History     Tobacco Use     Smoking status: Never Smoker     Smokeless tobacco: Never Used   Substance Use Topics     Alcohol use: No     Drug use: No     Family History   I have reviewed this patient's family history and updated it with pertinent information " if needed.   Family History   Problem Relation Age of Onset     Diabetes Mother      Hypertension Mother      Cerebrovascular Disease Mother      Lipids Mother      Diabetes Father      Heart Disease Father      Neurologic Disorder Father      Thyroid Disease Maternal Grandmother      Breast Cancer Maternal Grandmother      Respiratory Maternal Grandfather      Arthritis Paternal Grandmother      Heart Disease Paternal Grandfather      Prior to Admission Medications   Prior to Admission Medications   Prescriptions Last Dose Informant Patient Reported? Taking?   thyroid (ARMOUR THYROID) 60 MG tablet 2/19/2019 at AM  Yes Yes   Sig: Take 60 mg by mouth daily       Facility-Administered Medications: None     Allergies   Allergies   Allergen Reactions     Codeine      Heart Palpitations     Erythromycin Hives     No Clinical Screening - See Comments      Allergic to all narcotics.       Penicillins Hives     Physical Exam   Vital Signs: Temp: 97.5  F (36.4  C) Temp src: Oral BP: 136/71 Pulse: 72 Heart Rate: 57 Resp: 16 SpO2: 98 % O2 Device: None (Room air)    Weight: 134 lbs 0 oz    Constitutional: lying down in room with eyes closed, lights off, no apparent distress. opens eyes to voice and is awake, alert, cooperative. appears stated age  Eyes: Lids and lashes normal, extra ocular muscles intact, sclera clear, conjunctiva normal  ENT: Normocephalic, without obvious abnormality, atraumatic, external ears without lesions, oral pharynx with moist mucous membranes.  Hematologic / Lymphatic: no cervical lymphadenopathy and no supraclavicular lymphadenopathy  Respiratory: No increased work of breathing, good air exchange, clear to auscultation bilaterally, no crackles or wheezing  Cardiovascular: regular rate and rhythm and no murmur noted. Radial and pedal pulses 2+. No lower extremity edema  GI: No scars, normal bowel sounds, soft, non-distended, non-tender  Genitounirinary: Deferred  Skin: normal skin color, texture,  turgor  Musculoskeletal: Normal bulk and tone. No focal tenderness to palpation of spinous process, paraspinal muscles, left hip or left anterior thigh. Negative straight leg raise bilaterally. Pain with ROM of left hip. Strengthj 5/5 in bilateral lower extremities with plantar/dorsiflexion and flexion of hip.  Neurologic: Sensation to light touch intact in bilateral lower extremities. No saddle anethesisa.   Neuropsychiatric: lying with eyes closed, anxious appearing, appropriate eye contact upon waking    Data   Data reviewed today: I reviewed all medications, new labs and imaging results over the last 24 hours. I personally reviewed no images or EKG's today.    No lab results found in last 7 days.    Recent Results (from the past 24 hour(s))   Pelvis XR w/ unilateral hip left    Narrative    PELVIS AND HIP LEFT ONE VIEW   2/20/2019 3:22 PM     HISTORY: Pain.    COMPARISON: 1/24/2011 pelvis x-ray.      Impression    IMPRESSION: Hip joint spaces appear well preserved. No evidence of  acute fracture or subluxation. No evidence of avascular necrosis.    VEGA GILES MD

## 2019-02-27 ENCOUNTER — THERAPY VISIT (OUTPATIENT)
Dept: PHYSICAL THERAPY | Facility: CLINIC | Age: 59
End: 2019-02-27
Payer: COMMERCIAL

## 2019-02-27 DIAGNOSIS — M54.50 LOW BACK PAIN: Primary | ICD-10-CM

## 2019-02-27 PROCEDURE — 97140 MANUAL THERAPY 1/> REGIONS: CPT | Mod: GP | Performed by: PHYSICAL THERAPIST

## 2019-02-27 PROCEDURE — 97110 THERAPEUTIC EXERCISES: CPT | Mod: GP | Performed by: PHYSICAL THERAPIST

## 2019-02-27 NOTE — LETTER
[facility]  [address]  [place] [facility] [place] [facility] [address]  [phone]    2019    Re: Breanne Mckee   :   1960  MRN:  [id number]   REFERRING PHYSICIAN:   Luis Laureano    [facility]    Date of Initial Evaluation:  2019  Visits:  Rxs Used: 2  Reason for Referral:  Low back pain      PROGRESS  REPORT    Progress reporting period is from 2019 to 2019.       SUBJECTIVE  Subjective changes noted by patient:  .  Subjective: Pt had severely worsened pain since last time she was in and was seen in ED and also had injection.  She had massage which was somewhat helpful for pulling feeling in leg but has numbness and pain in left anterior thigh, groin, perinium.  Has lower back ache but has sharp pain laying down and pulling/numbness, pain when standing with pain becoming very sharp after a minute or so of being on feet.     Changes in function: Using cane for walking because of the pain.  Adverse reaction to treatment or activity: None    OBJECTIVE  Changes noted in objective findings:    Objective: Tenderness in left piriformis and lateral hip area.  Normal isometric left leg gross strength but numbness in anterior thigh, groin and left side or perineum. Sacral base traction and long axis left leg traction relieved leg/back pain. Slow but normal gait pattern after manual therapy.Had left anterior pelvic rotation again today, corrected with manual technique.  Leg felt better but still heavy after manual therapy today.    ASSESSMENT/PLAN  Updated problem list and treatment plan: Diagnosis 1:  Lumbar radiculopathy, but most pain originating in sacroiliac area  Pain -  manual therapy and education  Decreased ROM/flexibility - manual therapy and therapeutic exercise  STG/LTGs have been met or progress has been made towards goals:  Yes (See Goal flow sheet completed today.)  Assessment of Progress: The patient's condition has exacerbated, but  decompression/traction of sacroiliac joint produced some relief today,   Self Management Plans:  Patient has been instructed in a home treatment program.  I have re-evaluated this patient and find that the nature, scope, duration and intensity of the therapy is appropriate for the medical condition of the patient.  Breanne continues to require the following intervention to meet STG and LTG's:  PT    Re: Breanne Mckee   :   1960            Recommendations:  This patient would benefit from continued therapy.     Frequency:  2 X week, once daily  Duration:  for 4 weeks      This patient would benefit from further evaluation.            Thank you for your referral.    INQUIRIES  Therapist: Mahi Garcia, PT  [facility]  [address] [facility] [address]  Phone: [phone]

## 2019-04-29 ENCOUNTER — THERAPY VISIT (OUTPATIENT)
Dept: PHYSICAL THERAPY | Facility: CLINIC | Age: 59
End: 2019-04-29
Payer: COMMERCIAL

## 2019-04-29 DIAGNOSIS — G89.29 CHRONIC BILATERAL LOW BACK PAIN, WITH SCIATICA PRESENCE UNSPECIFIED: Primary | ICD-10-CM

## 2019-04-29 DIAGNOSIS — M54.5 CHRONIC BILATERAL LOW BACK PAIN, WITH SCIATICA PRESENCE UNSPECIFIED: Primary | ICD-10-CM

## 2019-04-29 PROCEDURE — 97110 THERAPEUTIC EXERCISES: CPT | Mod: GP | Performed by: PHYSICAL THERAPIST

## 2019-04-29 PROCEDURE — 97140 MANUAL THERAPY 1/> REGIONS: CPT | Mod: GP | Performed by: PHYSICAL THERAPIST

## 2019-04-29 PROCEDURE — 97530 THERAPEUTIC ACTIVITIES: CPT | Mod: GP | Performed by: PHYSICAL THERAPIST

## 2019-08-16 NOTE — PROGRESS NOTES
DISCHARGE SUMMARY    Breanne Mckee was seen 3 times for evaluation and treatment.  Patient did not return for further treatment and current status is unknown.  We discussed ongoing plan of care as  no objective or functional changes were made, and she may have pursued alternative treatment.  Please see goal flow sheet from episode noted date below and initial evaluation for further information.  Patient is discharged from therapy and therapy episode is resolved as of 08/16/19.      No linked episodes

## 2019-09-11 ENCOUNTER — THERAPY VISIT (OUTPATIENT)
Dept: PHYSICAL THERAPY | Facility: CLINIC | Age: 59
End: 2019-09-11
Payer: COMMERCIAL

## 2019-09-11 DIAGNOSIS — G89.29 CHRONIC BILATERAL LOW BACK PAIN, WITH SCIATICA PRESENCE UNSPECIFIED: Primary | ICD-10-CM

## 2019-09-11 DIAGNOSIS — M54.5 CHRONIC BILATERAL LOW BACK PAIN, WITH SCIATICA PRESENCE UNSPECIFIED: Primary | ICD-10-CM

## 2019-09-11 PROCEDURE — 97164 PT RE-EVAL EST PLAN CARE: CPT | Mod: GP | Performed by: PHYSICAL THERAPIST

## 2019-09-11 PROCEDURE — 97110 THERAPEUTIC EXERCISES: CPT | Mod: GP | Performed by: PHYSICAL THERAPIST

## 2019-09-12 NOTE — PROGRESS NOTES
"Subjective:  HPI                    Objective:  System    Physical Exam    General     ROS    Assessment/Plan:    PROGRESS  REPORT    Progress reporting period is from 4/29/2019 to 9/11/2019.       SUBJECTIVE  Subjective changes noted by patient: .  Subjective: Patient reutrns today to discuss current status and plan moving forward.  Since I saw her last she has had some progress with regards to pain and function after a combination of different treatments.  She has had 7 steriod injections into her hip as well as other medication which helped her progress with exercise and chiropractic therapy.  She did aquatic exercise program for 3 months and had chiropractic treatment including massage and stretching.  Since later in the summer she has been weaning off of meds as she is suffering from side effects from these.  Now that the meds are out of her system, she has been experiencing a return of pain and inflammation and is not functioning as well.   She can sit for about 15 minutes before she needs to move.  She can walk about one block before pain prevents her from continuing where she had been walking  2-3 blocks.  She can mange carrying/moving light weight if it is conveniently positioned and she does not reach too much.  The pain she experiences is constant \"nawing\"  across low back and buttock area and radiates into lateral left hip and anterior thigh, sometimes going to back of calf and ankle as well.  The pain intensity is better than it was last spring and she is thankful for that, but has had instances where a sudden pain into leg prevents safe walking especially descending stairs.  Sometimes she cannot rotate through pelvis normally with walking and walks very rigid and painful.  She would like to return to work but is unable to tolerate the physical aspects of her previous position without restrictions.  She hopes to find a position where she can utilize her skills without the physical aspects that worsen " her pain.     Current pain level is 6/10  .         OBJECTIVE  Changes noted in objective findings:  Yes.    ROM:   Lumbar flexion - hands to knees, pain in low back  Lumbar extension- moderate loss, mild pain at end-range but increases motion with reps  Lumbar rotation, moderate loss bilaterally and pain rotating left     Strength:  Normal lower extremity strength hip flex, abd, add,  knee flex/ext, toe raise/heel raise.  Lower abdominals 4/5, pelvic tilt with bracing  causes some pain in back    Decreased light touch sensation lateral and anterior left thigh.        ASSESSMENT/PLAN  Updated problem list and treatment plan: Diagnosis 1:  Low back pain  Pain -  home program  Decreased ROM/flexibility - home program  Decreased strength - home program  Decreased function - home program  STG/LTGs have been met or progress has been made towards goals:  Yes (See Goal flow sheet completed today.)  Assessment of Progress: The patient has had set backs in their progress.  Self Management Plans:  Patient is independent in a home treatment program.  I have re-evaluated this patient and find that the nature, scope, duration and intensity of the therapy is appropriate for the medical condition of the patient.  Breanne continues to require the following intervention to meet STG and LTG's:  PT intervention is no longer required to meet STG/LTG.    Recommendations:  Patient would benefit from the following:  She would like to continue chiropractic care as well as continue with the home exercises she has learned in PT.  Some manual treatment has been helpful here in the past  this can be accomplished at the chiropractor and she is happy with the care she has received there.  She agrees to call us if she needs any further asisstance such as progression of exercises.            Please refer to the daily flowsheet for treatment today, total treatment time and time spent performing 1:1 timed codes.

## 2019-10-03 ENCOUNTER — HEALTH MAINTENANCE LETTER (OUTPATIENT)
Age: 59
End: 2019-10-03

## 2020-02-08 ENCOUNTER — HEALTH MAINTENANCE LETTER (OUTPATIENT)
Age: 60
End: 2020-02-08

## 2020-11-07 ENCOUNTER — HEALTH MAINTENANCE LETTER (OUTPATIENT)
Age: 60
End: 2020-11-07

## 2021-02-04 ENCOUNTER — OFFICE VISIT (OUTPATIENT)
Dept: ORTHOPEDICS | Facility: CLINIC | Age: 61
End: 2021-02-04
Payer: OTHER MISCELLANEOUS

## 2021-02-04 ENCOUNTER — ANCILLARY PROCEDURE (OUTPATIENT)
Dept: GENERAL RADIOLOGY | Facility: CLINIC | Age: 61
End: 2021-02-04
Attending: FAMILY MEDICINE
Payer: OTHER MISCELLANEOUS

## 2021-02-04 VITALS — WEIGHT: 148 LBS | HEIGHT: 62 IN | BODY MASS INDEX: 27.23 KG/M2

## 2021-02-04 DIAGNOSIS — M54.2 NECK PAIN: ICD-10-CM

## 2021-02-04 DIAGNOSIS — M54.2 NECK PAIN: Primary | ICD-10-CM

## 2021-02-04 PROCEDURE — 99203 OFFICE O/P NEW LOW 30 MIN: CPT | Performed by: FAMILY MEDICINE

## 2021-02-04 PROCEDURE — 72040 X-RAY EXAM NECK SPINE 2-3 VW: CPT | Mod: GC | Performed by: STUDENT IN AN ORGANIZED HEALTH CARE EDUCATION/TRAINING PROGRAM

## 2021-02-04 ASSESSMENT — MIFFLIN-ST. JEOR: SCORE: 1189.57

## 2021-02-04 NOTE — PROGRESS NOTES
"      SPORTS & ORTHOPEDIC WALK-IN VISIT 2/4/2021    Primary Care Physician: Dr. Calixto    Here today for left-sided neck and upper back pain.  Walking in the building today she slipped and fell on her right side.  Did not strike her head or neck.  Is now having pain in the left side of her neck laterally.  Pain is worse with head movements.  She did take some ibuprofen which is helping.  No pain radiating into her upper extremities.  No tingling numbness weakness.    Reason for visit:     What part of your body is injured / painful?  left neck    What caused the injury /pain? Fall    How long ago did your injury occur or pain begin? today    What are your most bothersome symptoms? Pain    How would you characterize your symptom?  aching    What makes your symptoms better? Rest    What makes your symptoms worse? Movement    Have you been previously seen for this problem? No    Medical History:    Any recent changes to your medical history? No    Any new medication prescribed since last visit? No    Have you had surgery on this body part before? No    Social History:    Occupation: colorectal - nurse care coordinator    Handedness: Right    Exercise: admits she is more sedentary than she is used to    Review of Systems:    Do you have fever, chills, weight loss? No    Do you have any vision problems? No    Do you have any chest pain or edema? No    Do you have any shortness of breath or wheezing?  No    Do you have stomach problems? No    Do you have any numbness or focal weakness? No    Do you have diabetes? No    Do you have problems with bleeding or clotting? No    Do you have an rashes or other skin lesions? No         Past Medical History, Current Medications, and Allergies are reviewed in the electronic medical record as appropriate.       EXAM:Ht 1.575 m (5' 2\")   Wt 67.1 kg (148 lb)   BMI 27.07 kg/m      General: alert, pleasant, no distress  Neck/Spine: no TTP of spinous processes in cervical spine. Full ROM " with flexion, extension, rotation, tilting  with pain. positive  TTP along paraspinous muscles and upper trapezius on the left musculature. negative Periscapular pain.  positive  tightness in this area. No noted bruising or skin discoloration. Spurlings negative  Neurologic: SILT throughout upper extremities. Strength 5/5 and symmetric throughout upper extremities.   Upper Extremities: warm, well perfused, no edema. Full ROM without pain in shoulder, elbow, wrist, and hand. Good capillary refill bilaterally      Imagin view x-rays of the cervical spine performed reviewed independently demonstrating no acute fracture or listhesis.  Disc height loss particularly at C5-6.  Loss of normal cervical lordosis.  See EMR for formal radiology report.      Assessment: Patient is a 60 year old female with left-sided neck pain after a fall earlier today.  Suspect mostly muscular injury.  Possibly transient nerve impingement.  No radicular symptoms.    Recommendations:   Reviewed imaging and assessment with the patient in detail  She would like to continue using OTC ibuprofen.  Offered muscle relaxant and the patient declines stating she has had reaction to similar medication in the past.  She was provided with home exercises.  May consider referral to physical therapy.  Follow-up as needed.    Leonard Roldan MD

## 2021-03-05 ENCOUNTER — RECORDS - HEALTHEAST (OUTPATIENT)
Dept: LAB | Facility: CLINIC | Age: 61
End: 2021-03-05

## 2021-03-05 LAB
T3FREE SERPL-MCNC: 3.2 PG/ML (ref 1.9–3.9)
T4 FREE SERPL-MCNC: 1.1 NG/DL (ref 0.7–1.8)
TSH SERPL DL<=0.005 MIU/L-ACNC: 2.49 UIU/ML (ref 0.3–5)

## 2021-03-06 ENCOUNTER — ANCILLARY PROCEDURE (OUTPATIENT)
Dept: GENERAL RADIOLOGY | Facility: CLINIC | Age: 61
End: 2021-03-06
Attending: FAMILY MEDICINE
Payer: OTHER MISCELLANEOUS

## 2021-03-06 ENCOUNTER — OFFICE VISIT (OUTPATIENT)
Dept: ORTHOPEDICS | Facility: CLINIC | Age: 61
End: 2021-03-06
Payer: OTHER MISCELLANEOUS

## 2021-03-06 VITALS — WEIGHT: 148 LBS | BODY MASS INDEX: 27.23 KG/M2 | HEIGHT: 62 IN

## 2021-03-06 DIAGNOSIS — M25.551 RIGHT HIP PAIN: Primary | ICD-10-CM

## 2021-03-06 LAB — THYROGLOB AB SERPL-ACNC: <0.9 IU/ML (ref 0–4)

## 2021-03-06 PROCEDURE — 20610 DRAIN/INJ JOINT/BURSA W/O US: CPT | Mod: RT | Performed by: FAMILY MEDICINE

## 2021-03-06 PROCEDURE — 73502 X-RAY EXAM HIP UNI 2-3 VIEWS: CPT | Mod: RT | Performed by: RADIOLOGY

## 2021-03-06 PROCEDURE — 99213 OFFICE O/P EST LOW 20 MIN: CPT | Mod: 25 | Performed by: FAMILY MEDICINE

## 2021-03-06 RX ORDER — TRIAMCINOLONE ACETONIDE 40 MG/ML
40 INJECTION, SUSPENSION INTRA-ARTICULAR; INTRAMUSCULAR
Status: SHIPPED | OUTPATIENT
Start: 2021-03-06

## 2021-03-06 RX ORDER — LIDOCAINE HYDROCHLORIDE 10 MG/ML
5 INJECTION, SOLUTION EPIDURAL; INFILTRATION; INTRACAUDAL; PERINEURAL
Status: SHIPPED | OUTPATIENT
Start: 2021-03-06

## 2021-03-06 RX ADMIN — LIDOCAINE HYDROCHLORIDE 5 ML: 10 INJECTION, SOLUTION EPIDURAL; INFILTRATION; INTRACAUDAL; PERINEURAL at 12:00

## 2021-03-06 RX ADMIN — TRIAMCINOLONE ACETONIDE 40 MG: 40 INJECTION, SUSPENSION INTRA-ARTICULAR; INTRAMUSCULAR at 12:00

## 2021-03-06 ASSESSMENT — MIFFLIN-ST. JEOR: SCORE: 1189.57

## 2021-03-06 NOTE — NURSING NOTE
[address] [address]  Dept: [phone]  ______________________________________________________________________________    Patient: Breanne Mckee   : 1960   MRN: [id number]   2021    INVASIVE PROCEDURE SAFETY CHECKLIST    Date: 2021  Procedure:Right greater trochanteric bursa CSI  Patient Name: Breanne Mckee  MRN: [id number]  YOB: 1960    Action: Complete sections as appropriate. Any discrepancy results in a HARD COPY until resolved.     PRE PROCEDURE:  Patient ID verified with 2 identifiers (name and  or MRN): Yes  Procedure and site verified with patient/designee (when able): Yes  Accurate consent documentation in medical record: Yes  H&P (or appropriate assessment) documented in medical record: Yes  H&P must be up to 20 days prior to procedure and updates within 24 hours of procedure as applicable: NA  Relevant diagnostic and radiology test results appropriately labeled and displayed as applicable: Yes  Procedure site(s) marked with provider initials: NA    TIMEOUT:  Time-Out performed immediately prior to starting procedure, including verbal and active participation of all team members addressing the following:Yes  * Correct patient identify  * Confirmed that the correct side and site are marked  * An accurate procedure consent form  * Agreement on the procedure to be done  * Correct patient position  * Relevant images and results are properly labeled and appropriately displayed  * The need to administer antibiotics or fluids for irrigation purposes during the procedure as applicable   * Safety precautions based on patient history or medication use    DURING PROCEDURE: Verification of correct person, site, and procedures any time the responsibility for care of the patient is transferred to another member of the care team.       Prior to injection, verified patient identity using patient's name and  date of birth.  Due to injection administration, patient instructed to remain in clinic for 15 minutes  afterwards, and to report any adverse reaction to me immediately.    Bursa injection was performed.   and Joint injection was performed.      Drug Amount Wasted:  Yes: 1 mg/ml   Vial/Syringe: Single dose vial  Expiration Date:  02/01/2024      Janet Abarca, ATC  March 6, 2021

## 2021-03-06 NOTE — LETTER
3/6/2021         RE: Breanne Mckee  [address] [place] [address]        Dear Colleague,    Thank you for referring your patient, Breanne Mckee, to the [facility]. Please see a copy of my visit note below.      [facility]  SPORTS & ORTHOPEDIC CLINIC VISIT     Mar 6, 2021        ASSESSMENT & PLAN    60-year-old female with right trochanteric bursitis    Reviewed imaging and assessment with patient in detail  Discussed option for treatments including home exercises, physical therapy, OTC medications, steroid injection.  After discussion patient like to pursue steroid injection.  See procedure note for details.  HEP was given.  She will contact us if she would be to pursue PT    Leonard Roldan MD  [facility]    -----  Chief Complaint   Patient presents with     Right Hip - Pain       SUBJECTIVE  Breanne Mckee is a/an 60 year old female who is seen as a self referral for evaluation of right hip pain after a fall on 2/4/201. She fell on ice on sidewalk of [facility] [place], [place]. Seen on 2/4/21 for neck and right shoulder with Dr. Roldan. Reports that right hip has been bothering her since then but has progressively has got worse.     The patient is seen with their .    Onset: 4 week(s) ago. Patient describes injury as fall on ice on sidewalk of [facility] [place], [place] on her way into work.  Location of Pain: right lateral hip  Worsened by: stairs, pain with crossing legs. Pain has increased.  Better with: Has not got better  Treatments tried: rest/activity avoidance, ice, Tylenol and chiropractic care (1 visits)  Associated symptoms: swelling and pain.    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      REVIEW OF SYSTEMS:    Do you have fever, chills, weight loss? No    Do you have any vision problems? No    Do you have any chest pain or edema? No    Do you have any  "shortness of breath or wheezing?  No    Do you have stomach problems? No    Do you have any numbness or focal weakness? Yes, left thigh numbness since low back injury (2019).    Do you have diabetes? No    Do you have problems with bleeding or clotting? No    Do you have an rashes or other skin lesions? No    OBJECTIVE:  Ht 1.575 m (5' 2\")   Wt 67.1 kg (148 lb)   BMI 27.07 kg/m       Exam:  Patient is alert, in no acute distress, pleasant and conversational.    Gait: Nonantalgic.  Normal heel toe gait    Standing Exam:  Lumbar spine AROM normal.     right Hip:  Supine PROM:  Flexion: Approximately 115 , no tenderness.  External rotation: approximately 60 , no tenderness.  Internal rotation: Approximately 30 , no tenderness  No subjective pain reported with range of motion testing. ROM IS symmetric with uninjured side     Strength Testing:  Hip flexion: 5/5.  Hip adduction: 5/5.  Hip abduction: 4+/5.  No subjective pain reported with strength testing. Strength IS symmetric with uninjured side.     Palpation:  positive  tenderness to palpation over the greater trochanter.  negative tenderness to palpation over psoas  negative tenderness to palpation over ASIS  negative tenderness to palpation over Iliac crest  negative tenderness to palpation along the piriformis.  negative tenderness to palpation of the SI joint    Special Tests:  negative Luis Fernando's test.   positive  CHANELLE's test  negative FADIR's test  negative Scour test  negative Reported pain with resisted hip flexion to opposite shoulder     Neurovascularly intact in bilateral lower extremities        RADIOLOGY:    2 view xrays of right hip performed and reviewed independently demonstrating mild degenerative changes.  No fracture. See EMR for formal radiology report.         .Large Joint Injection/Arthocentesis: R greater trochanteric bursa    Date/Time: 3/6/2021 12:00 PM  Performed by: Leonard Roldan MD  Authorized by: Leonard Roldan MD "     Indications:  Pain  Needle Size:  25 G  Guidance: landmark guided    Approach:  Lateral  Location:  Hip      Site:  R greater trochanteric bursa  Medications:  40 mg triamcinolone 40 MG/ML; 5 mL lidocaine (PF) 1 %  Consent Given by:  Patient  Timeout: timeout called immediately prior to procedure    Prep: patient was prepped and draped in usual sterile fashion       There were no complications. The patient tolerated the procedure well. There was minimal bleeding.   The patient was instructed to ice the right hip upon leaving clinic and refrain from overuse over the next 2 days.   The patient was instructed to go to the emergency room with any unusual pain, swelling, or redness occurred in the injected area.     Leonard Roldan MD

## 2021-03-06 NOTE — PROGRESS NOTES
[facility] AND SURGERY CENTER  SPORTS & ORTHOPEDIC CLINIC VISIT     Mar 6, 2021        ASSESSMENT & PLAN    60-year-old female with right trochanteric bursitis    Reviewed imaging and assessment with patient in detail  Discussed option for treatments including home exercises, physical therapy, OTC medications, steroid injection.  After discussion patient like to pursue steroid injection.  See procedure note for details.  HEP was given.  She will contact us if she would be to pursue PT    Leonard Roldan MD  [facility] [place]    -----  Chief Complaint   Patient presents with     Right Hip - Pain       SUBJECTIVE  Breanne Mckee is a/an 60 year old female who is seen as a self referral for evaluation of right hip pain after a fall on 2/4/201. She fell on ice on sidewalk of [facility] [place]. Seen on 2/4/21 for neck and right shoulder with Dr. Roldan. Reports that right hip has been bothering her since then but has progressively has got worse.     The patient is seen with their .    Onset: 4 week(s) ago. Patient describes injury as fall on ice on sidewalk of [facility] [place] on her way into work.  Location of Pain: right lateral hip  Worsened by: stairs, pain with crossing legs. Pain has increased.  Better with: Has not got better  Treatments tried: rest/activity avoidance, ice, Tylenol and chiropractic care (1 visits)  Associated symptoms: swelling and pain.    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      REVIEW OF SYSTEMS:    Do you have fever, chills, weight loss? No    Do you have any vision problems? No    Do you have any chest pain or edema? No    Do you have any shortness of breath or wheezing?  No    Do you have stomach problems? No    Do you have any numbness or focal weakness? Yes, left thigh numbness since low back injury (2019).    Do you have diabetes? No    Do you have problems with bleeding or clotting? No    Do you have an  "rashes or other skin lesions? No    OBJECTIVE:  Ht 1.575 m (5' 2\")   Wt 67.1 kg (148 lb)   BMI 27.07 kg/m       Exam:  Patient is alert, in no acute distress, pleasant and conversational.    Gait: Nonantalgic.  Normal heel toe gait    Standing Exam:  Lumbar spine AROM normal.     right Hip:  Supine PROM:  Flexion: Approximately 115 , no tenderness.  External rotation: approximately 60 , no tenderness.  Internal rotation: Approximately 30 , no tenderness  No subjective pain reported with range of motion testing. ROM IS symmetric with uninjured side     Strength Testing:  Hip flexion: 5/5.  Hip adduction: 5/5.  Hip abduction: 4+/5.  No subjective pain reported with strength testing. Strength IS symmetric with uninjured side.     Palpation:  positive  tenderness to palpation over the greater trochanter.  negative tenderness to palpation over psoas  negative tenderness to palpation over ASIS  negative tenderness to palpation over Iliac crest  negative tenderness to palpation along the piriformis.  negative tenderness to palpation of the SI joint    Special Tests:  negative Luis Fernando's test.   positive  CHANELLE's test  negative FADIR's test  negative Scour test  negative Reported pain with resisted hip flexion to opposite shoulder     Neurovascularly intact in bilateral lower extremities        RADIOLOGY:    2 view xrays of right hip performed and reviewed independently demonstrating mild degenerative changes.  No fracture. See EMR for formal radiology report.         .Large Joint Injection/Arthocentesis: R greater trochanteric bursa    Date/Time: 3/6/2021 12:00 PM  Performed by: Leonard Roldan MD  Authorized by: Leonard Roldan MD     Indications:  Pain  Needle Size:  25 G  Guidance: landmark guided    Approach:  Lateral  Location:  Hip      Site:  R greater trochanteric bursa  Medications:  40 mg triamcinolone 40 MG/ML; 5 mL lidocaine (PF) 1 %  Consent Given by:  Patient  Timeout: timeout called " immediately prior to procedure    Prep: patient was prepped and draped in usual sterile fashion       There were no complications. The patient tolerated the procedure well. There was minimal bleeding.   The patient was instructed to ice the right hip upon leaving clinic and refrain from overuse over the next 2 days.   The patient was instructed to go to the emergency room with any unusual pain, swelling, or redness occurred in the injected area.     Leonard Roldan MD

## 2021-04-30 ENCOUNTER — E-VISIT (OUTPATIENT)
Dept: URGENT CARE | Facility: CLINIC | Age: 61
End: 2021-04-30
Payer: COMMERCIAL

## 2021-04-30 ENCOUNTER — COMMUNICATION - HEALTHEAST (OUTPATIENT)
Dept: SCHEDULING | Facility: CLINIC | Age: 61
End: 2021-04-30

## 2021-04-30 DIAGNOSIS — Z20.822 CLOSE EXPOSURE TO 2019 NOVEL CORONAVIRUS: Primary | ICD-10-CM

## 2021-04-30 PROCEDURE — 99207 PR NO CHARGE LOS: CPT | Performed by: NURSE PRACTITIONER

## 2021-04-30 NOTE — PATIENT INSTRUCTIONS
"  Dear Breanne Mckee,    Based on your exposure to COVID-19 (coronavirus), we would like to test you for this virus. I have placed an order for this test. The best time for testing is 5-7 days after the exposure.    How to schedule:  For all employees or close contacts (except [place] and [employer] - see below), go to your [employer] home page and scroll down to the section that says  You have an appointment that needs to be scheduled  and click the large green button that says  Schedule Now  and follow the steps to find the next available opening.     If you are unable to complete these steps or if you cannot find any available times, please call [phone] to schedule employee testing.     [place] employees or close contacts, please call [phone].   [place] ([employer]) employees or close contacts call [phone].    Return to work/school/ guidance:   For people with high risk exposures outside the home    Please let your workplace manager and staffing office know when your quarantine ends.     We can not give you an exact date as it depends on the information below. You can calculate this on your own or work with your manager/staffing office to calculate this. (For example if you were exposed on 10/4, you would have to quarantine for 14 full days. That would be through 10/18. You could return on 10/19.)    Quarantine Guidelines:  Patients (\"contacts\") who have been in close prolonged contact of an infected person(s) (within six feet for at least 15 minutes within a 24 hour period), and remain asymptomatic should enter quarantine based on the following options:    14-day quarantine period (this remains the CDC recommendation for the greatest protection against spread of COVID-19) OR    Minimum 7-day quarantine with negative RT-PCR test collected on day 5 or later OR    10-day quarantine with no test  Quarantine Guideline exceptions are as follows:    People who have been fully vaccinated do not " need to quarantine if the exposure was at least 2 weeks after the last vaccination. This includes vaccinated health care workers.    Not fully vaccinated and unvaccinated Individuals who work in health care, congregate care, or congregate living should be off work for 14 days from their last date of exposure. Community activities for this group can be resumed based on options above. Fully vaccinated individuals in this group do not need to quarantine from work after exposure.    Not fully vaccinated and unvaccinated people whose high-risk exposure was a household member should always quarantine for 14 days from their last date of exposure. Fully vaccinated people in this category do not need to quarantine.    Not fully vaccinated or unvaccinated residents of congregate care and congregate living settings should always quarantine for 14 days from their last date of exposure. Fully vaccinated residents do not need to quarantine.    Note: If you have ongoing exposure to the covid positive person, this quarantine period may be more than 14 days. (For example, if you are continued to be exposed to your child who tested positive and cannot isolate from them, then the quarantine of 7-14 days can't start until your child is no longer contagious. This is typically 10 days from onset of the child's symptoms. So the total duration may be 17-24 days in this case.)    You should continue symptom monitoring until day 14 post-exposure. If you develop signs or symptoms of COVID-19, isolate and get tested (even if you have been tested already).    How to quarantine:   Stay home and away from others. Don't go to school or anywhere else. Generally quarantine means staying home from work but there are some exceptions to this. Please contact your workplace.  No hugging, kissing or shaking hands.  Don't let anyone visit.  Cover your mouth and nose with a mask, tissue or washcloth to avoid spreading germs.  Wash your hands and face often.  Use soap and water.    What are the symptoms of COVID-19?  The most common symptoms are cough, fever and trouble breathing. Less common symptoms include headache, body aches, fatigue (feeling very tired), chills, sore throat, stuffy or runny nose, diarrhea (loose poop), loss of taste or smell, belly pain, and nausea or vomiting (feeling sick to your stomach or throwing up).  After 14 days, if you have still don't have symptoms, you likely don't have this virus.  If you develop symptoms, follow these guidelines.  If you're normally healthy: Please start another eVisit.  If you have a serious health problem (like cancer, heart failure, an organ transplant or kidney disease): Call your specialty clinic. Let them know that you might have COVID-19.    Where can I get more information?   [facility] [place] - About COVID-19: [email or web address]  CDC - What to Do If You're Sick: www.cdc.gov/coronavirus/2019-ncov/about/steps-when-sick.html  CDC - Ending Home Isolation: www.cdc.gov/coronavirus/2019-ncov/hcp/disposition-in-home-patients.html  CDC - Caring for Someone: www.cdc.gov/coronavirus/2019-ncov/if-you-are-sick/care-for-someone.html  [facility] clinical trials (COVID-19 research studies): clinicalaffairs.[facility]-clinical-trials  Below are the COVID-19 hotlines at the [facility] ([facility]). Interpreters are available.  For health questions: Call 713-230-1425 or 1-106.570.9334 (7 a.m. to 7 p.m.)  For questions about schools and childcare: Call 305-232-9539 or 1-952.378.5152 (7 a.m. to 7 p.m.)      April 30, 2021  RE:  Breanne Mckee                                                                                                                   [id number] [facility] [id number]      To whom it may concern:    I evaluated Breanne Mckee on April 30, 2021. Breanne Mckee should be excused from work/school.    You will need to contact [place] [facility] for a  "return to work date.    We can not give an exact date as it depends on the information below. They can calculate this on their own or work with their manager/staffing office to calculate this. (For example if they were exposed on 10/04, they would have to quarantine for 14 full days. That would be through 10/18. They could return on 10/19.)    Quarantine Guidelines:    Patients (\"contacts\") who have been in close prolonged contact of an infected person(s) (within six feet for at least 15 minutes within a 24 hour period) and remain asymptomatic should enter quarantine based on the following options:      14-day quarantine period (this remains the CDC recommendation for the greatest protection against spread of COVID-19) OR    Minimum 7-day quarantine with negative RT-PCR test collected on day 5 or later OR    10-day quarantine with no test   Quarantine Guideline exceptions are as follows:    People who have been fully vaccinated do not need to quarantine if the exposure was at least 2 weeks after the last vaccination. This includes vaccinated health care workers.    Not fully vaccinated and unvaccinated Individuals who work in health care, congregate care, or congregate living should be off work for 14 days from their last date of exposure. Community activities for this group can be resumed based on options above. Fully vaccinated individuals in this group do not need to quarantine from work after exposure.    Not fully vaccinated and unvaccinated people whose high-risk exposure was a household member should always quarantine for 14 days from their last date of exposure. Fully vaccinated people in this category do not need to quarantine.    Not fully vaccinated or unvaccinated residents of congregate care and congregate living settings should always quarantine for 14 days from their last date of exposure. Fully vaccinated residents do not need to quarantine.    Note: If there is ongoing exposure to the covid positive " person, this quarantine period may be longer than 14 days. (For example, if they are continually exposed to their child, who tested positive and cannot isolate from them, then the quarantine of 7-14 days can't start until their child is no longer contagious. This is typically 10 days from onset to the child's symptoms. So the total duration may be 17-24 days in this case.)    Breanne Mckee should continue symptom monitoring until day 14 post-exposure. If they develop signs or symptoms of COVID-19, they should isolate and get tested (even if they have been tested already).    Sincerely,  Leela Fitzpatrick, CNP

## 2021-06-17 NOTE — TELEPHONE ENCOUNTER
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  Says her  has covid19 pneumonia.  He first developed symptoms last Thursday.  She contacted employee occupational health today.  They suggested she get tested and advised she must quarantine for 14 days after last exposure.  She went to [facility] and was tested today.  Does not have her results yet.  She says she lives with her  so is constantly exposed.  Is asking when \"last exposure\" would be.      Advised caller that since he is considered contagious for 10 days from start of symptoms, his day 10 would be her 'last exposure.'  So she should quarantine with him for his 10 days and then an additional 14 days following day 10.  This is due to incubation period of the virus ranging from 2-14 days.  Advised to quarantine and watch for symptoms.    Advised to call back if further questions.    Alexandria Antonio RN  Triage Nurse Advisor    Reason for Disposition    [1] CLOSE CONTACT COVID-19 EXPOSURE within last 14 days AND [2] NO symptoms    Protocols used: CORONAVIRUS (COVID-19) EXPOSURE-A- 3.25.21      "

## 2021-09-05 ENCOUNTER — HEALTH MAINTENANCE LETTER (OUTPATIENT)
Age: 61
End: 2021-09-05

## 2021-12-26 ENCOUNTER — HEALTH MAINTENANCE LETTER (OUTPATIENT)
Age: 61
End: 2021-12-26

## 2022-02-20 ENCOUNTER — HEALTH MAINTENANCE LETTER (OUTPATIENT)
Age: 62
End: 2022-02-20

## 2022-03-02 NOTE — PROGRESS NOTES
Subjective:  HPI                    Objective:  System    Physical Exam    General     ROS    Assessment/Plan:    PROGRESS  REPORT    Progress reporting period is from 2/18/2019 to 2/27/2019.       SUBJECTIVE  Subjective changes noted by patient:  .  Subjective: Pt had severely worsened pain since last time she was in and was seen in ED and also had injection.  She had massage which was somewhat helpful for pulling feeling in leg but has numbness and pain in left anterior thigh, groin, perinium.  Has lower back ache but has sharp pain laying down and pulling/numbness, pain when standing with pain becoming very sharp after a minute or so of being on feet.     Changes in function: Using cane for walking because of the pain.  Adverse reaction to treatment or activity: None    OBJECTIVE  Changes noted in objective findings:    Objective: Tenderness in left piriformis and lateral hip area.  Normal isometric left leg gross strength but numbness in anterior thigh, groin and left side or perineum. Sacral base traction and long axis left leg traction relieved leg/back pain. Slow but normal gait pattern after manual therapy.Had left anterior pelvic rotation again today, corrected with manual technique.  Leg felt better but still heavy after manual therapy today.    ASSESSMENT/PLAN  Updated problem list and treatment plan: Diagnosis 1:  Lumbar radiculopathy, but most pain originating in sacroiliac area  Pain -  manual therapy and education  Decreased ROM/flexibility - manual therapy and therapeutic exercise  STG/LTGs have been met or progress has been made towards goals:  Yes (See Goal flow sheet completed today.)  Assessment of Progress: The patient's condition has exacerbated, but decompression/traction of sacroiliac joint produced some relief today,   Self Management Plans:  Patient has been instructed in a home treatment program.  I have re-evaluated this patient and find that the nature, scope, duration and intensity of  the therapy is appropriate for the medical condition of the patient.  Breanne continues to require the following intervention to meet STG and LTG's:  PT    Recommendations:  This patient would benefit from continued therapy.     Frequency:  2 X week, once daily  Duration:  for 4 weeks      This patient would benefit from further evaluation.    Please refer to the daily flowsheet for treatment today, total treatment time and time spent performing 1:1 timed codes.           Yes

## 2022-10-23 ENCOUNTER — HEALTH MAINTENANCE LETTER (OUTPATIENT)
Age: 62
End: 2022-10-23

## 2023-04-02 ENCOUNTER — HEALTH MAINTENANCE LETTER (OUTPATIENT)
Age: 63
End: 2023-04-02

## 2024-03-06 ENCOUNTER — HOSPITAL ENCOUNTER (EMERGENCY)
Facility: CLINIC | Age: 64
Discharge: HOME | End: 2024-03-06
Attending: FAMILY MEDICINE | Admitting: FAMILY MEDICINE
Payer: COMMERCIAL

## 2024-03-06 VITALS
DIASTOLIC BLOOD PRESSURE: 60 MMHG | OXYGEN SATURATION: 99 % | HEIGHT: 62 IN | RESPIRATION RATE: 12 BRPM | HEART RATE: 54 BPM | SYSTOLIC BLOOD PRESSURE: 123 MMHG | BODY MASS INDEX: 24.84 KG/M2 | TEMPERATURE: 98.5 F | WEIGHT: 135 LBS

## 2024-03-06 DIAGNOSIS — E86.0 DEHYDRATION: ICD-10-CM

## 2024-03-06 DIAGNOSIS — R55 NEAR SYNCOPE: ICD-10-CM

## 2024-03-06 LAB
ALBUMIN SERPL BCG-MCNC: 3.9 G/DL (ref 3.5–5.2)
ALP SERPL-CCNC: 51 U/L (ref 40–150)
ALT SERPL W P-5'-P-CCNC: 30 U/L (ref 0–50)
ANION GAP SERPL CALCULATED.3IONS-SCNC: 15 MMOL/L (ref 7–15)
AST SERPL W P-5'-P-CCNC: 30 U/L (ref 0–45)
BASOPHILS # BLD AUTO: 0 10E3/UL (ref 0–0.2)
BASOPHILS NFR BLD AUTO: 1 %
BILIRUB SERPL-MCNC: 0.3 MG/DL
BUN SERPL-MCNC: 13.8 MG/DL (ref 8–23)
CALCIUM SERPL-MCNC: 8.9 MG/DL (ref 8.8–10.2)
CHLORIDE SERPL-SCNC: 105 MMOL/L (ref 98–107)
CREAT SERPL-MCNC: 1.09 MG/DL (ref 0.51–0.95)
DEPRECATED HCO3 PLAS-SCNC: 21 MMOL/L (ref 22–29)
EGFRCR SERPLBLD CKD-EPI 2021: 56 ML/MIN/1.73M2
EOSINOPHIL # BLD AUTO: 0.1 10E3/UL (ref 0–0.7)
EOSINOPHIL NFR BLD AUTO: 1 %
ERYTHROCYTE [DISTWIDTH] IN BLOOD BY AUTOMATED COUNT: 14.8 % (ref 10–15)
GLUCOSE SERPL-MCNC: 108 MG/DL (ref 70–99)
HCT VFR BLD AUTO: 37.7 % (ref 35–47)
HGB BLD-MCNC: 12.1 G/DL (ref 11.7–15.7)
IMM GRANULOCYTES # BLD: 0 10E3/UL
IMM GRANULOCYTES NFR BLD: 0 %
LYMPHOCYTES # BLD AUTO: 1.6 10E3/UL (ref 0–5.3)
LYMPHOCYTES NFR BLD AUTO: 26 %
MCH RBC QN AUTO: 26.9 PG (ref 26.5–33)
MCHC RBC AUTO-ENTMCNC: 32.1 G/DL (ref 31.5–36.5)
MCV RBC AUTO: 84 FL (ref 78–100)
MONOCYTES # BLD AUTO: 0.4 10E3/UL (ref 0–1.3)
MONOCYTES NFR BLD AUTO: 7 %
NEUTROPHILS # BLD AUTO: 3.8 10E3/UL (ref 1.6–8.3)
NEUTROPHILS NFR BLD AUTO: 65 %
NRBC # BLD AUTO: 0 10E3/UL
NRBC BLD AUTO-RTO: 0 /100
PLATELET # BLD AUTO: 271 10E3/UL (ref 150–450)
POTASSIUM SERPL-SCNC: 3.7 MMOL/L (ref 3.4–5.3)
PROT SERPL-MCNC: 6.2 G/DL (ref 6.4–8.3)
RBC # BLD AUTO: 4.5 10E6/UL (ref 3.8–5.2)
SODIUM SERPL-SCNC: 141 MMOL/L (ref 135–145)
WBC # BLD AUTO: 5.9 10E3/UL (ref 4–11)

## 2024-03-06 PROCEDURE — 96360 HYDRATION IV INFUSION INIT: CPT | Performed by: FAMILY MEDICINE

## 2024-03-06 PROCEDURE — 99283 EMERGENCY DEPT VISIT LOW MDM: CPT | Performed by: FAMILY MEDICINE

## 2024-03-06 PROCEDURE — 258N000003 HC RX IP 258 OP 636: Performed by: FAMILY MEDICINE

## 2024-03-06 PROCEDURE — 85004 AUTOMATED DIFF WBC COUNT: CPT | Performed by: FAMILY MEDICINE

## 2024-03-06 PROCEDURE — 36415 COLL VENOUS BLD VENIPUNCTURE: CPT | Performed by: FAMILY MEDICINE

## 2024-03-06 PROCEDURE — 82247 BILIRUBIN TOTAL: CPT | Performed by: FAMILY MEDICINE

## 2024-03-06 PROCEDURE — 99284 EMERGENCY DEPT VISIT MOD MDM: CPT | Performed by: FAMILY MEDICINE

## 2024-03-06 RX ADMIN — SODIUM CHLORIDE, POTASSIUM CHLORIDE, SODIUM LACTATE AND CALCIUM CHLORIDE 1000 ML: 600; 310; 30; 20 INJECTION, SOLUTION INTRAVENOUS at 11:02

## 2024-03-06 ASSESSMENT — COLUMBIA-SUICIDE SEVERITY RATING SCALE - C-SSRS
2. HAVE YOU ACTUALLY HAD ANY THOUGHTS OF KILLING YOURSELF IN THE PAST MONTH?: NO
6. HAVE YOU EVER DONE ANYTHING, STARTED TO DO ANYTHING, OR PREPARED TO DO ANYTHING TO END YOUR LIFE?: NO
1. IN THE PAST MONTH, HAVE YOU WISHED YOU WERE DEAD OR WISHED YOU COULD GO TO SLEEP AND NOT WAKE UP?: NO

## 2024-03-06 ASSESSMENT — ACTIVITIES OF DAILY LIVING (ADL)
ADLS_ACUITY_SCORE: 39
ADLS_ACUITY_SCORE: 39

## 2024-03-06 NOTE — ED TRIAGE NOTES
Pt fasts 18 hours and eats for 6 hours.  Pt [facility] the [place] [facility].  Pt started to feel weak today therefore laid herself on the ground and called 911. She was concerned about hypoglycemia.  Upon EMS arrival pt BG was normal >100's.  Pressures soft, but remained greater than 100SBP.       Triage Assessment (Adult)       Row Name 03/06/24 1047          Triage Assessment    Airway WDL WDL        Respiratory WDL    Respiratory WDL WDL        Skin Circulation/Temperature WDL    Skin Circulation/Temperature WDL WDL        Cardiac WDL    Cardiac WDL WDL        Peripheral/Neurovascular WDL    Peripheral Neurovascular WDL WDL

## 2024-03-06 NOTE — DISCHARGE INSTRUCTIONS
Your diet is somewhat overly restricted in calories.  You should liberalize it a bit.  Be sure to get plenty of fluids.  Be seen if symptoms recur or new concerning symptoms develop such as fainting or significant pain although this is unlikely to occur.  Remember that when your diet program is complete, strength training is your friend.

## 2024-03-06 NOTE — ED PROVIDER NOTES
History     Chief Complaint   Patient presents with    Generalized Weakness     HPI    Breanne Mckee is a 63 year old female who comes in with her  via EMS after feeling faint this morning.  She has been on a very restricted diet through a special program that involves limiting herself to 560 christiano/day and fasting in the morning having meals at 1 and 5 PM.  She takes protein supplements with this.  This morning she got up and did not have breakfast and did vigorous housework and then went to take a shower and felt faint and so she laid down and called 911.  She was concerned that she was hypoglycemic and tried eating some chocolate chips.  Her blood glucose on medic assessment was normal.  During transport she received 400 cc of crystalloid.  Now she feels better.  She has not recently been ill.  She has not had cough, cold, fever, flu symptoms.    Allergies:  Allergies   Allergen Reactions    Erythromycin Hives    Morphine Other (See Comments)     Dizziness, nausea, hypotension    Morphine And Related      Heart Palpitations    Other [No Clinical Screening - See Comments]      Allergic to all narcotics.      Pcn [Penicillins] Hives       Problem List:    Patient Active Problem List    Diagnosis Date Noted    Vitamin D deficiency 02/13/2014     Priority: Medium    Vaginitis and vulvovaginitis 11/12/2013     Priority: Medium     Problem list name updated by automated process. Provider to review      Shoulder pain 05/09/2013     Priority: Medium    Low back pain 01/31/2012     Priority: Medium    HYPERLIPIDEMIA LDL GOAL <160 10/31/2010     Priority: Medium    Major depression, recurrent (H24) 11/24/2009     Priority: Medium     Overview:   1990-severe depression with apparent suicide attempt requiring tendon repair.  Stable off medications for many years.      Cholelithiasis 08/28/2009     Priority: Medium     Overview:   Noted on ultrasound 2009      GERD (gastroesophageal reflux disease) 08/28/2009      "Priority: Medium    Hypothyroidism 02/10/2006     Priority: Medium     Hx of positive anitbodies with sx of severe depression in past.  Apparently had test showing poor metabolism from T4 to T3.  Started on armour thyroid.  All function test have been normal.  [facility] [place]  Problem list name updated by automated process. Provider to review          Past Medical History:    Past Medical History:   Diagnosis Date    NAREN 2 4/06    Depressive disorder, not elsewhere classified     Headache(784.0)     Other and unspecified hyperlipidemia     Unspecified hypothyroidism        Past Surgical History:    Past Surgical History:   Procedure Laterality Date    CHOLECYSTECTOMY, LAPOROSCOPIC  3/22/10    LEEP TX, CERVICAL  4/06    NAREN 2       Family History:    Family History   Problem Relation Age of Onset    Diabetes Mother     Hypertension Mother     Cerebrovascular Disease Mother     Lipids Mother     Diabetes Father     Heart Disease Father     Neurologic Disorder Father     Thyroid Disease Maternal Grandmother     Breast Cancer Maternal Grandmother     Respiratory Maternal Grandfather     Arthritis Paternal Grandmother     Heart Disease Paternal Grandfather        Social History:  Marital Status:   [2]  Social History     Tobacco Use    Smoking status: Never    Smokeless tobacco: Never   Substance Use Topics    Alcohol use: No    Drug use: No        Medications:    acetaminophen (TYLENOL) 325 MG tablet  omeprazole (PRILOSEC) 20 MG DR capsule  ondansetron (ZOFRAN-ODT) 4 MG ODT tab  thyroid (ARMOUR THYROID) 60 MG tablet          Review of Systems  All other systems are reviewed and are negative    Physical Exam   BP: 133/59  Pulse: 56  Temp: 98.5  F (36.9  C)  Resp: 16  Height: 157.5 cm (5' 2\")  Weight: 61.2 kg (135 lb)  SpO2: 100 %      Physical Exam    Nursing note and vitals were reviewed.  Constitutional: Awake and alert, adequately nourished and developed appearing 63-year-old in no " apparent discomfort, who does not appear acutely ill, and who answers questions appropriately and cooperates with examination.  HEENT: Voice quality is normal.  PERRL.  Speech is fluent.  EOMI.   Neck: Freely mobile.  Cardiovascular: Cardiac examination reveals normal heart rate and regular rhythm without murmur.  Pulmonary/Chest: Breathing is unlabored.  Breath sounds are clear and equal bilaterally.  There no retractions, tachypnea, rales, wheezes, or rhonchi.  Abdomen: Soft, nontender, no HSM or masses rebound or guarding.  Musculoskeletal: Extremities are warm and well-perfused and without edema  Neurological: Alert, oriented, thought content logical, coherent   Skin: Warm, dry, no rashes.  Psychiatric: Affect broad and appropriate.    ED Course        Procedures              Critical Care time:  none               Results for orders placed or performed during the hospital encounter of 03/06/24 (from the past 24 hour(s))   CBC with platelets, differential    Narrative    The following orders were created for panel order CBC with platelets, differential.  Procedure                               Abnormality         Status                     ---------                               -----------         ------                     CBC with platelets and d...[[id number]]                      Final result                 Please view results for these tests on the individual orders.   Comprehensive metabolic panel   Result Value Ref [facility]    Sodium 141 135 - 145 mmol/L    Potassium 3.7 3.4 - 5.3 mmol/L    Carbon Dioxide (CO2) 21 (L) 22 - 29 mmol/L    Anion Gap 15 7 - 15 mmol/L    Urea Nitrogen 13.8 8.0 - 23.0 mg/dL    Creatinine 1.09 (H) 0.51 - 0.95 mg/dL    GFR Estimate 56 (L) >60 mL/min/1.73m2    Calcium 8.9 8.8 - 10.2 mg/dL    Chloride 105 98 - 107 mmol/L    Glucose 108 (H) 70 - 99 mg/dL    Alkaline Phosphatase 51 40 - 150 U/L    AST 30 0 - 45 U/L    ALT 30 0 - 50 U/L    Protein Total 6.2 (L) 6.4 - 8.3 g/dL    Albumin  3.9 3.5 - 5.2 g/dL    Bilirubin Total 0.3 <=1.2 mg/dL   CBC with platelets and differential   Result Value Ref [facility]    WBC Count 5.9 4.0 - 11.0 10e3/uL    RBC Count 4.50 3.80 - 5.20 10e6/uL    Hemoglobin 12.1 11.7 - 15.7 g/dL    Hematocrit 37.7 35.0 - 47.0 %    MCV 84 78 - 100 fL    MCH 26.9 26.5 - 33.0 pg    MCHC 32.1 31.5 - 36.5 g/dL    RDW 14.8 10.0 - 15.0 %    Platelet Count 271 150 - 450 10e3/uL    % Neutrophils 65 %    % Lymphocytes 26 %    % Monocytes 7 %    % Eosinophils 1 %    % Basophils 1 %    % Immature Granulocytes 0 %    NRBCs per 100 WBC 0 <1 /100    Absolute Neutrophils 3.8 1.6 - 8.3 10e3/uL    Absolute Lymphocytes 1.6 0.0 - 5.3 10e3/uL    Absolute Monocytes 0.4 0.0 - 1.3 10e3/uL    Absolute Eosinophils 0.1 0.0 - 0.7 10e3/uL    Absolute Basophils 0.0 0.0 - 0.2 10e3/uL    Absolute Immature Granulocytes 0.0 <=0.4 10e3/uL    Absolute NRBCs 0.0 10e3/uL       Medications   lactated ringers BOLUS 1,000 mL (0 mLs Intravenous Stopped 3/6/24 1200)       Assessments & Plan (with Medical Decision Making)     63-year-old female presented with an episode of near syncope as described above.  She has been on a very restrictive diet in the last several weeks limiting yourself to 560 christiano/day.  The diet is very high in protein with minimal carbohydrate.  Her physical examination is normal with normal vital signs.  Her laboratory studies are all normal with the exception of mild elevation of the BUN and creatinine consistent with mild dehydration.  Abdominal examination is benign.  Her cardiopulmonary examination is normal.  I suspect her symptoms are due to an overly restrictive diet.  We discussed that this is an extremely low calorie diet and she did not eat breakfast this morning and I think this accounts for near syncope.  We discussed this is not really due to hypoglycemia but more to dehydration.  She received a fluid bolus during transport and the ambulance.  She feels back to normal now.  I offered her  additional IV fluids but she prefers to go home and try oral hydration.  I think this is acceptable.  She should return if she has further episodes or new concerning symptoms.  She should liberalize her caloric intake.  She can still have a negative energy balance but 560 christiano is extremely calorically limited and excessively aggressive for weight loss at her age.    I have reviewed the nursing notes.    I have reviewed the findings, diagnosis, plan and need for follow up with the patient.         New Prescriptions    No medications on file       Final diagnoses:   Near syncope   Dehydration       3/6/2024   [facility] EMERGENCY DEPT       Franki Rueda MD  03/06/24 1210

## 2024-03-30 ENCOUNTER — HEALTH MAINTENANCE LETTER (OUTPATIENT)
Age: 64
End: 2024-03-30

## 2024-06-08 ENCOUNTER — HEALTH MAINTENANCE LETTER (OUTPATIENT)
Age: 64
End: 2024-06-08

## (undated) RX ORDER — TRIAMCINOLONE ACETONIDE 40 MG/ML
INJECTION, SUSPENSION INTRA-ARTICULAR; INTRAMUSCULAR
Status: DISPENSED
Start: 2021-03-06

## (undated) RX ORDER — LIDOCAINE HYDROCHLORIDE 10 MG/ML
INJECTION, SOLUTION EPIDURAL; INFILTRATION; INTRACAUDAL; PERINEURAL
Status: DISPENSED
Start: 2021-03-06